# Patient Record
Sex: FEMALE | Race: WHITE | NOT HISPANIC OR LATINO | Employment: OTHER | URBAN - METROPOLITAN AREA
[De-identification: names, ages, dates, MRNs, and addresses within clinical notes are randomized per-mention and may not be internally consistent; named-entity substitution may affect disease eponyms.]

---

## 2017-03-06 ENCOUNTER — OFFICE VISIT - HEALTHEAST (OUTPATIENT)
Dept: FAMILY MEDICINE | Facility: CLINIC | Age: 52
End: 2017-03-06

## 2017-03-06 ENCOUNTER — COMMUNICATION - HEALTHEAST (OUTPATIENT)
Dept: TELEHEALTH | Facility: CLINIC | Age: 52
End: 2017-03-06

## 2017-03-06 DIAGNOSIS — H60.90 OTITIS EXTERNA: ICD-10-CM

## 2017-03-12 ENCOUNTER — OFFICE VISIT - HEALTHEAST (OUTPATIENT)
Dept: FAMILY MEDICINE | Facility: CLINIC | Age: 52
End: 2017-03-12

## 2017-03-12 DIAGNOSIS — H66.90 OTITIS MEDIA: ICD-10-CM

## 2017-03-12 DIAGNOSIS — H65.90 SEROUS OTITIS MEDIA: ICD-10-CM

## 2017-03-12 DIAGNOSIS — H60.90 OTITIS EXTERNA: ICD-10-CM

## 2017-09-05 ENCOUNTER — OFFICE VISIT - HEALTHEAST (OUTPATIENT)
Dept: FAMILY MEDICINE | Facility: CLINIC | Age: 52
End: 2017-09-05

## 2017-09-05 ENCOUNTER — COMMUNICATION - HEALTHEAST (OUTPATIENT)
Dept: SCHEDULING | Facility: CLINIC | Age: 52
End: 2017-09-05

## 2017-09-05 DIAGNOSIS — R42 DIZZINESS: ICD-10-CM

## 2017-09-05 DIAGNOSIS — M79.10 MYALGIA: ICD-10-CM

## 2018-07-06 ENCOUNTER — OFFICE VISIT - HEALTHEAST (OUTPATIENT)
Dept: FAMILY MEDICINE | Facility: CLINIC | Age: 53
End: 2018-07-06

## 2018-07-06 ENCOUNTER — COMMUNICATION - HEALTHEAST (OUTPATIENT)
Dept: TELEHEALTH | Facility: CLINIC | Age: 53
End: 2018-07-06

## 2018-07-06 DIAGNOSIS — J02.9 SORE THROAT: ICD-10-CM

## 2018-07-06 DIAGNOSIS — R07.1 CHEST PAIN ON BREATHING: ICD-10-CM

## 2018-07-06 DIAGNOSIS — Z87.19 HX OF ESOPHAGEAL ULCER: ICD-10-CM

## 2018-07-06 DIAGNOSIS — R07.81 PLEURITIC CHEST PAIN: ICD-10-CM

## 2018-07-06 LAB
ATRIAL RATE - MUSE: 64 BPM
D DIMER PPP FEU-MCNC: 0.53 FEU UG/ML
DIASTOLIC BLOOD PRESSURE - MUSE: NORMAL MMHG
ERYTHROCYTE [DISTWIDTH] IN BLOOD BY AUTOMATED COUNT: 16.8 % (ref 11–14.5)
HCT VFR BLD AUTO: 33.7 % (ref 35–47)
HGB BLD-MCNC: 10.6 G/DL (ref 12–16)
INTERPRETATION ECG - MUSE: NORMAL
MCH RBC QN AUTO: 25.1 PG (ref 27–34)
MCHC RBC AUTO-ENTMCNC: 31.5 G/DL (ref 32–36)
MCV RBC AUTO: 80 FL (ref 80–100)
P AXIS - MUSE: 36 DEGREES
PLATELET # BLD AUTO: 317 THOU/UL (ref 140–440)
PMV BLD AUTO: 9.1 FL (ref 8.5–12.5)
PR INTERVAL - MUSE: 150 MS
QRS DURATION - MUSE: 94 MS
QT - MUSE: 418 MS
QTC - MUSE: 431 MS
R AXIS - MUSE: 18 DEGREES
RBC # BLD AUTO: 4.23 MILL/UL (ref 3.8–5.4)
SYSTOLIC BLOOD PRESSURE - MUSE: NORMAL MMHG
T AXIS - MUSE: 41 DEGREES
TROPONIN I SERPL-MCNC: <0.01 NG/ML (ref 0–0.29)
VENTRICULAR RATE- MUSE: 64 BPM
WBC: 3.8 THOU/UL (ref 4–11)

## 2018-07-06 RX ORDER — METOCLOPRAMIDE 10 MG/1
10 TABLET ORAL
Qty: 30 TABLET | Refills: 0 | Status: SHIPPED | OUTPATIENT
Start: 2018-07-06 | End: 2021-08-04

## 2018-07-11 ENCOUNTER — OFFICE VISIT - HEALTHEAST (OUTPATIENT)
Dept: FAMILY MEDICINE | Facility: CLINIC | Age: 53
End: 2018-07-11

## 2018-07-11 DIAGNOSIS — D50.9 IRON DEFICIENCY ANEMIA, UNSPECIFIED IRON DEFICIENCY ANEMIA TYPE: ICD-10-CM

## 2018-07-11 DIAGNOSIS — E88.09 CYP2D6 DEFICIENCY: ICD-10-CM

## 2018-07-11 DIAGNOSIS — J20.9 ACUTE BRONCHITIS TREATED WITH ANTIBIOTICS IN THE PAST 60 DAYS: ICD-10-CM

## 2018-07-11 DIAGNOSIS — Z12.11 SCREEN FOR COLON CANCER: ICD-10-CM

## 2018-07-11 ASSESSMENT — MIFFLIN-ST. JEOR: SCORE: 1510.34

## 2018-08-01 ENCOUNTER — RECORDS - HEALTHEAST (OUTPATIENT)
Dept: GENERAL RADIOLOGY | Facility: CLINIC | Age: 53
End: 2018-08-01

## 2018-08-01 ENCOUNTER — AMBULATORY - HEALTHEAST (OUTPATIENT)
Dept: LAB | Facility: CLINIC | Age: 53
End: 2018-08-01

## 2018-08-01 DIAGNOSIS — D50.9 IRON DEFICIENCY ANEMIA, UNSPECIFIED IRON DEFICIENCY ANEMIA TYPE: ICD-10-CM

## 2018-08-01 DIAGNOSIS — J20.9 ACUTE BRONCHITIS, UNSPECIFIED: ICD-10-CM

## 2018-08-01 LAB
BASOPHILS # BLD AUTO: 0 THOU/UL (ref 0–0.2)
BASOPHILS NFR BLD AUTO: 1 % (ref 0–2)
EOSINOPHIL # BLD AUTO: 0.1 THOU/UL (ref 0–0.4)
EOSINOPHIL NFR BLD AUTO: 3 % (ref 0–6)
ERYTHROCYTE [DISTWIDTH] IN BLOOD BY AUTOMATED COUNT: 15.3 % (ref 11–14.5)
FERRITIN SERPL-MCNC: 4 NG/ML (ref 10–130)
HCT VFR BLD AUTO: 32.7 % (ref 35–47)
HGB BLD-MCNC: 10.5 G/DL (ref 12–16)
IRON SATN MFR SERPL: 13 % (ref 20–50)
IRON SERPL-MCNC: 51 UG/DL (ref 42–175)
LYMPHOCYTES # BLD AUTO: 1.7 THOU/UL (ref 0.8–4.4)
LYMPHOCYTES NFR BLD AUTO: 41 % (ref 20–40)
MCH RBC QN AUTO: 24.7 PG (ref 27–34)
MCHC RBC AUTO-ENTMCNC: 32.2 G/DL (ref 32–36)
MCV RBC AUTO: 77 FL (ref 80–100)
MONOCYTES # BLD AUTO: 0.3 THOU/UL (ref 0–0.9)
MONOCYTES NFR BLD AUTO: 9 % (ref 2–10)
NEUTROPHILS # BLD AUTO: 1.9 THOU/UL (ref 2–7.7)
NEUTROPHILS NFR BLD AUTO: 47 % (ref 50–70)
PLATELET # BLD AUTO: 411 THOU/UL (ref 140–440)
PMV BLD AUTO: 6.8 FL (ref 7–10)
RBC # BLD AUTO: 4.27 MILL/UL (ref 3.8–5.4)
TIBC SERPL-MCNC: 404 UG/DL (ref 313–563)
TRANSFERRIN SERPL-MCNC: 323 MG/DL (ref 212–360)
WBC: 4 THOU/UL (ref 4–11)

## 2018-08-02 ENCOUNTER — OFFICE VISIT - HEALTHEAST (OUTPATIENT)
Dept: FAMILY MEDICINE | Facility: CLINIC | Age: 53
End: 2018-08-02

## 2018-08-02 DIAGNOSIS — J20.9 ACUTE BRONCHITIS WITH COEXISTING CONDITION REQUIRING PROPHYLACTIC TREATMENT: ICD-10-CM

## 2018-08-02 DIAGNOSIS — D50.9 ANEMIA, IRON DEFICIENCY: ICD-10-CM

## 2018-08-02 LAB
ALBUMIN UR-MCNC: NEGATIVE MG/DL
APPEARANCE UR: CLEAR
BACTERIA #/AREA URNS HPF: ABNORMAL HPF
BILIRUB UR QL STRIP: NEGATIVE
COLOR UR AUTO: YELLOW
GLUCOSE UR STRIP-MCNC: NEGATIVE MG/DL
HGB UR QL STRIP: NEGATIVE
KETONES UR STRIP-MCNC: NEGATIVE MG/DL
LEUKOCYTE ESTERASE UR QL STRIP: ABNORMAL
MUCOUS THREADS #/AREA URNS LPF: ABNORMAL LPF
NITRATE UR QL: NEGATIVE
PH UR STRIP: 6 [PH] (ref 5–8)
RBC #/AREA URNS AUTO: ABNORMAL HPF
SP GR UR STRIP: 1.01 (ref 1–1.03)
SQUAMOUS #/AREA URNS AUTO: ABNORMAL LPF
UROBILINOGEN UR STRIP-ACNC: ABNORMAL
WBC #/AREA URNS AUTO: ABNORMAL HPF
YEAST #/AREA URNS HPF: ABNORMAL HPF

## 2018-08-02 ASSESSMENT — MIFFLIN-ST. JEOR: SCORE: 1483.12

## 2018-08-03 ENCOUNTER — HOSPITAL ENCOUNTER (OUTPATIENT)
Dept: ULTRASOUND IMAGING | Facility: CLINIC | Age: 53
Discharge: HOME | End: 2018-08-03
Attending: FAMILY MEDICINE

## 2018-08-03 ENCOUNTER — COMMUNICATION - HEALTHEAST (OUTPATIENT)
Dept: TELEHEALTH | Facility: CLINIC | Age: 53
End: 2018-08-03

## 2018-08-03 DIAGNOSIS — D50.9 ANEMIA, IRON DEFICIENCY: ICD-10-CM

## 2018-08-06 ENCOUNTER — COMMUNICATION - HEALTHEAST (OUTPATIENT)
Dept: FAMILY MEDICINE | Facility: CLINIC | Age: 53
End: 2018-08-06

## 2019-10-28 ENCOUNTER — COMMUNICATION - HEALTHEAST (OUTPATIENT)
Dept: SCHEDULING | Facility: CLINIC | Age: 54
End: 2019-10-28

## 2021-05-26 ENCOUNTER — RECORDS - HEALTHEAST (OUTPATIENT)
Dept: ADMINISTRATIVE | Facility: CLINIC | Age: 56
End: 2021-05-26

## 2021-05-29 ENCOUNTER — RECORDS - HEALTHEAST (OUTPATIENT)
Dept: ADMINISTRATIVE | Facility: CLINIC | Age: 56
End: 2021-05-29

## 2021-05-30 ENCOUNTER — RECORDS - HEALTHEAST (OUTPATIENT)
Dept: ADMINISTRATIVE | Facility: CLINIC | Age: 56
End: 2021-05-30

## 2021-05-30 VITALS — WEIGHT: 204.2 LBS | BODY MASS INDEX: 32.96 KG/M2

## 2021-05-30 VITALS — BODY MASS INDEX: 33.01 KG/M2 | WEIGHT: 204.5 LBS

## 2021-05-31 ENCOUNTER — RECORDS - HEALTHEAST (OUTPATIENT)
Dept: ADMINISTRATIVE | Facility: CLINIC | Age: 56
End: 2021-05-31

## 2021-05-31 VITALS — BODY MASS INDEX: 31.99 KG/M2 | WEIGHT: 198.2 LBS

## 2021-06-01 VITALS — WEIGHT: 191 LBS | HEIGHT: 66 IN | BODY MASS INDEX: 30.7 KG/M2

## 2021-06-01 VITALS — BODY MASS INDEX: 31.66 KG/M2 | HEIGHT: 66 IN | WEIGHT: 197 LBS

## 2021-06-01 VITALS — BODY MASS INDEX: 31.31 KG/M2 | WEIGHT: 194 LBS

## 2021-06-02 ENCOUNTER — RECORDS - HEALTHEAST (OUTPATIENT)
Dept: ADMINISTRATIVE | Facility: CLINIC | Age: 56
End: 2021-06-02

## 2021-06-02 NOTE — TELEPHONE ENCOUNTER
She need to be seen, preferably at the walk-in care clinic or if symptoms are worsening she can go to the ER.

## 2021-06-02 NOTE — TELEPHONE ENCOUNTER
She is calling about having a UTI.  Has been pushing fluids today.    Has had burning and back ache since 5 am today.  Frequency.    Patient asking if she can come in and leave urine sample and medication called in to her pharmacy.    Pharmacy confirmed.    Provider please advise and have team update patient    Darby Leggett RN, [facility] Nurse Triage/Med Refills RN

## 2021-06-02 NOTE — TELEPHONE ENCOUNTER
Reason for Disposition    Side (flank) or lower back pain present    Severe pain with urination    Protocols used: URINATION PAIN - FEMALE-A-[place]

## 2021-06-09 NOTE — PROGRESS NOTES
Saturday night ear plugged on flight back from vacation snorkeling  Pain right ear and felt pressure throb and poking sharp sensation felt needed to pop.   Then 3 am popped but the hearing did not come.  Popped three times and then hearing felt normal.   Some liquid came out.    ROS: as noted above    OBJECTIVE:   Vitals:    03/06/17 1354   BP: 110/64   Pulse: 80   Resp: 20   Temp: 97.2  F (36.2  C)      Eyes: non icteric, noninflamed  Lungs: no resp distress  Heart: regular  Ankles: no edema  Muscles: nontender  Mental status: euthymic  Neuro: nonfocal  Right canal narrowed some.  Pain on pinna motion.  tms are benign  ASSESSMENT/PLAN:    1. Otitis externa  neomycin-polymyxin-hydrocortisone (CORTISPORIN) otic solution     Anticipate resolution otherwise return.  Return sooner if symptoms worsen.  More than 10 of fifteen total minutes time spent education counseling regarding the issues and care of same as listed in the assessment and plan of this note

## 2021-06-09 NOTE — PROGRESS NOTES
"ASSESSMENT:   1. Otitis externa  ciprofloxacin-dexamethasone (CIPRODEX) otic suspension   2. Otitis media  amoxicillin-clavulanate (AUGMENTIN) 875-125 mg per tablet   3. Serous otitis media             PLAN:  Right ear- internal ear infection (otitis media) PLUS external ear infection (otitis externa)  Left ear- serous otitis (non infected fluid behind ear drum)    1. STOP Polytrim drops. START ciprodex drops x 7 days.    2. Add in oral Augmentin twice daily with food x 10 days to treat right otitis media (internal ear infection). Take with food. Take a probiotic while on the medication.    3. Add in Mucinex (plain variety- not DM) twice daily x 7 days. This treats the serous otitis (non-infected fluid behind left ear drum).      Follow up with primary care provider if not improving or worsening. Come back immediately if fevers, extreme pain, fluid draining from ear, hearing loss, facial swelling, or any other new concerns.      Discussed red flags for immediate return to clinic/ER, as well as indications for follow up if no improvement. Patient understood and agreed to plan. Patient was stable for discharge.          SUBJECTIVE:   Darlene Rhodes is a 51 y.o. female who presents today for evaluation of right ear pain persistent for 1 week. She was prescribed Polytrim otic drops on Monday, 3/6 for otitis externa. She felt like symptoms were getting somewhat better but are now again worsening and she is on day 6 of treatment. Pain is radiating down to the throat and right side of jaw. Last night, when symptoms were getting worse, she had a moment of time where she felt \"like she was going to pass out.\" She felt nauseous when this occurred. She denies actual syncope. None of this has persisted. No drainage from ear. Hearing sounds muffled on right side but no total hearing loss.  She is able to open her mouth. No fevers. No history of chronic otitis externa. She is not diabetic.    Past Medical History:  Patient " Active Problem List   Diagnosis     Esophageal Reflux     Allergies     Feet Hallux Limitus     Vitamin D Deficiency     Hyperlipidemia     Drug Toxicity     Plantar Fasciitis Of The Left Foot     CYP2D6 deficiency       Surgical History:  Reviewed; Non-contributory      Family History:  Family History   Problem Relation Age of Onset     Anuerysm Paternal Grandmother      brain     Prostate cancer Paternal Grandfather      Reviewed; Non-contributory      Social History:    History   Smoking Status     Never Smoker   Smokeless Tobacco     Not on file     Smoking: none  Alcohol use: occasionally  Other drug use: none  Occupation: director of [employer]        Current Medications:  Current Outpatient Prescriptions on File Prior to Visit   Medication Sig Dispense Refill     lansoprazole (PREVACID) 30 MG capsule TAKE 1 CAPSULE (30 MG TOTAL) BY MOUTH DAILY. 90 capsule 2     neomycin-polymyxin-hydrocortisone (CORTISPORIN) otic solution Administer 3 drops to the right ear 3 (three) times a day for 10 days. 10 mL 2     phenazopyridine (PYRIDIUM) 200 MG tablet Take 1 tablet (200 mg total) by mouth 3 (three) times a day as needed for pain. 15 tablet 1     viscous lidocaine HC (LIDOCAINE) 2 % Soln viscous solution USE 30 ML'S BY MOUTH EVERY 2-4 HOURS AS NEEDED 100 mL PRN     No current facility-administered medications on file prior to visit.        Allergies:   Allergies   Allergen Reactions     Animal Dander      Grass      Hydrocodone-Acetaminophen      Annotation: patient does not respond to this medication       Hydromorphone      Annotation: patient gets dizzy, headache, and does not respond to this       Hydroxyzine Pamoate      Annotation: does not work       Iodinated Contrast Media - Oral And Iv Dye Itching     Other       Morphine      Annotation: patient does not respond to this medication       Ondansetron      Annotation: does not work       Oxycodone-Acetaminophen      Annotation: patient does not  respond to this medication         I personally reviewed patient's past medical, surgical, social, family history and allergies.    ROS:  Review of Systems  See HPI, otherwise negative      OBJECTIVE:   Visit Vitals     /72     Pulse 74     Temp 98.3  F (36.8  C) (Oral)     Resp 10     Wt 204 lb 3.2 oz (92.6 kg)     LMP 02/07/2017     SpO2 100%     BMI 32.96 kg/m2         General Appearance:  Alert, well-appearing female in NAD. Afebrile.    HEENT:  Head: Atraumatic, normocephalic.  No facial swelling. No jaw or temporal tenderness.  Eyes: Conjunctiva clear, Lids normal.  Ears:  Right ear: canal edema and mild erythema but still patent. Scant purulent drainage in lower canal. TM is mildly erythematous and not transparent. Left ear: TM with thick, white fluid behind it, not transparent. No canal erythema or edema.  Nose: nares patent.  Oropharynx: No trismus. No posterior pharyngeal erythema or exudate. No tonsillar hypertrophy. Uvula midline. Moist mucus membranes.  Neck: Supple, no lymphadenopathy. Mild tenderness with palpation or infra-auricular area on right side of neck. No meningismus.  Respiratory: No distress. Lungs clear to ausculation bilaterally.   Cardiovascular: Regular rate and rhythm, no murmur, rub or gallop. No obvious chest wall deformities.   Neurologic: Alert and orientated appropriately. No focal deficits. Facial movements symmetric.

## 2021-06-12 NOTE — PROGRESS NOTES
Assessment:     1. Myalgia  HM2(CBC w/o Differential)    Lyme Antibody [place]   2. Dizziness  HM2(CBC w/o Differential)    Lyme Antibody [place]     Results for orders placed or performed in visit on 09/05/17   HM2(CBC w/o Differential)   Result Value Ref Range    WBC 6.0 4.0 - 11.0 thou/uL    RBC 4.43 3.80 - 5.40 mill/uL    Hemoglobin 11.4 (L) 12.0 - 16.0 g/dL    Hematocrit 35.0 35.0 - 47.0 %    MCV 79 (L) 80 - 100 fL    MCH 25.7 (L) 27.0 - 34.0 pg    MCHC 32.6 32.0 - 36.0 g/dL    RDW 13.5 11.0 - 14.5 %    Platelets 343 140 - 440 thou/uL    MPV 6.8 (L) 7.0 - 10.0 fL     Myalgia, fatigue and dizziness - all resolving. May be a viral infection. Will also check lyme, although low pretest probability. Normal exam, no red flags at this time.      Plan:   Myalgia and dizziness  Will contact with lab results and treat if indicated  Push fluids, change positions slowly  Get extra rest today  Tylenol or Ibuprofen as needed for body aches  Monitor for fever or symptoms. Return to clinic if symptoms are not improving as expected or if worsening in any way.       Subjective:       Darlene Rhodes is a 51 y.o. female who presents for evaluation of dizziness and joint and muscle pain and fatigue.    This started 3 days ago with significant joint and muscle pains which has since improved. She rates the pain currently as a 3/10 generalized achiness. She has taken Ibuprofen 600mg  without obvious results. She has also had fatigue. Today she woke up early this morning and was very dizzy, the room spinning around her. She had trouble walking because of unsteady gait, slight nausea. It has gotten significantly better since then but does still not feel quite right. She has been eating and drinking well. No medication changes. She is occasionally outdoors, but rare exposure to woodsy areas. She does have pets.     Denies fever, chills, URI symptoms, cough, chest pain, SOB, vision or speech changes, weakness, paresthesia, rash.      Review of Systems  Pertinent items are noted in HPI.      Objective:      /76  Pulse 80  Temp 98.9  F (37.2  C) (Oral)   Resp 16  Wt 198 lb 3.2 oz (89.9 kg)  SpO2 99%  Breastfeeding? No  BMI 31.99 kg/m2  General appearance: alert, appears stated age and cooperative  Head: Normocephalic, without obvious abnormality, atraumatic  Eyes: conjunctivae/corneas clear. PERRL, EOM's intact. Fundi benign.  Ears: normal TM's and external ear canals both ears  Nose: Nares normal. Septum midline. Mucosa normal. No drainage or sinus tenderness.  Throat: lips, mucosa, and tongue normal; teeth and gums normal  Neck: no adenopathy and supple, symmetrical, trachea midline  Lungs: clear to auscultation bilaterally  Heart: regular rate and rhythm, S1, S2 normal, no murmur, click, rub or gallop  Skin: Skin color, texture, turgor normal. No rashes or lesions  Neurologic: Alert and oriented X 3, normal strength and tone. Normal symmetric reflexes. Normal coordination and gait, CN II -XII grossly intact. Neg romberg and pronator drift. Chloe Hallpike does not lateralize to either side.

## 2021-06-19 NOTE — PROGRESS NOTES
Subjective:      Patient ID: Darlene Rhodes is a 52 y.o. female.    Chief Complaint:    HPI Darlene Rhodes is a 52 y.o. female who presents today cough and crackling sensation in her lungs x 3 days. Patient feel slighly shortness of breath when walking walking stairs. She was feeling faint with this type of exertion. She denies any hx of heart disease and palpitations. Several years ago she was getting sharp chest pain that was from an esophageal ulcer and a hiatal hernia.  She was chronically started on Prevacid and she occasionally has endoscopy checkups.  She will at times get esophageal spasms that go on for hours. She takes a GI cocktail as needed for her esophageal spasms. In the past couple of months she has had these spasms twice.  When she has esophageal spasms she finds it difficult to take a deep breaths because of the pain.  About a month ago she had a cold with sore throat, chills, and cough.  It seemed to resolve but then when this returned she thought maybe it was an unresolved infection.        Social History   Substance Use Topics     Smoking status: Never Smoker     Smokeless tobacco: Never Used     Alcohol use Yes      Comment: 1-2 / week       Review of Systems   Constitutional: Negative for fever.   HENT: Negative for congestion, ear pain, rhinorrhea and sore throat.    Respiratory: Positive for cough, chest tightness and shortness of breath.    Gastrointestinal: Positive for nausea. Negative for diarrhea and vomiting.       Objective:     /68  Pulse 88  Temp 98.6  F (37  C) (Oral)   Resp 14  Wt 194 lb (88 kg)  SpO2 98%  Breastfeeding? No  BMI 31.31 kg/m2    Physical Exam   Constitutional: She appears well-developed and well-nourished. No distress.   HENT:   Head: Normocephalic and atraumatic.   Right Ear: External ear normal.   Left Ear: External ear normal.   Eyes: Conjunctivae are normal.   Neck: Normal range of motion. Neck supple.   Cardiovascular: Normal rate, regular  rhythm and normal heart sounds.  Exam reveals no gallop and no friction rub.    No murmur heard.  Pulmonary/Chest: Effort normal and breath sounds normal. No respiratory distress. She has no wheezes. She has no rales. She exhibits no tenderness.   Loose cough intermittently   Lymphadenopathy:     She has no cervical adenopathy.   Skin: She is not diaphoretic.   Psychiatric: She has a normal mood and affect. Her behavior is normal. Judgment and thought content normal.   Nursing note and vitals reviewed.    Labs:  Recent Results (from the past 24 hour(s))   Electrocardiogram Perform - Clinic   Result Value Ref Range    SYSTOLIC BLOOD PRESSURE  mmHg    DIASTOLIC BLOOD PRESSURE  mmHg    VENTRICULAR RATE 64 BPM    ATRIAL RATE 64 BPM    P-R INTERVAL 150 ms    QRS DURATION 94 ms    Q-T INTERVAL 418 ms    QTC CALCULATION (BEZET) 431 ms    P Axis 36 degrees    R AXIS 18 degrees    T AXIS 41 degrees    MUSE DIAGNOSIS       Normal sinus rhythm  Normal ECG  When compared with ECG of 01-MAR-2016 21:19,  No significant change was found     D-dimer, Quantitative   Result Value Ref Range    D-Dimer, Quant 0.53 (H) <=0.50 FEU ug/mL   HM2(CBC w/o Differential)   Result Value Ref Range    WBC 3.8 (L) 4.0 - 11.0 thou/uL    RBC 4.23 3.80 - 5.40 mill/uL    Hemoglobin 10.6 (L) 12.0 - 16.0 g/dL    Hematocrit 33.7 (L) 35.0 - 47.0 %    MCV 80 80 - 100 fL    MCH 25.1 (L) 27.0 - 34.0 pg    MCHC 31.5 (L) 32.0 - 36.0 g/dL    RDW 16.8 (H) 11.0 - 14.5 %    Platelets 317 140 - 440 thou/uL    MPV 9.1 8.5 - 12.5 fL         Clinical Decision Making:  Patient's ASCVD is 1.9% for risk of cardiac event with her lipid panel collected in 2013.  I suspect that this chest pain is pleuritic is likely secondary to esophageal ulcer pain and spasms.  Patient's chest x-ray showed mild atelectasis which may contribute to her cough.  Suspect that this is secondary to shallow breathing from guarding the esophageal pain.  Patient's chest x-ray was negative for any  cardiomegaly, pleural effusion,  or pulmonary edema.  Her EKG was negative for any signs of ischemic changes or pulmonary embolism.  She is vitally normal.  Single troponin, d-dimer, and CBC were drawn today to help rule out cardiac involvement, embolism, and infectious process. Patient is non-diaphoretic and in NAD, so I have low suspicion of cardiac involvement. Patient has follow up with her PCP in 5 days. Patient was started on Reglan and Lidocaine was reordered for symptomatic relief. Recommend deep breathing exercises for atelectasis tx. D-dimer came back elevated. The patient was called and informed and instructed to seek emergency medical attention for further workup. Patient was agreeable to r/o PE.     Assessment:     Procedures    1. Pleuritic chest pain  XR Chest 2 Views   2. Chest pain on breathing  Electrocardiogram Perform - Clinic    Troponin I    D-dimer, Quantitative    HM2(CBC w/o Differential)    benzonatate (TESSALON) 200 MG capsule   3. Hx of esophageal ulcer  metoclopramide (REGLAN) 10 MG tablet   4. Sore throat  viscous lidocaine HC (LIDOCAINE) 2 % Soln viscous solution         Patient Instructions   1.  Your chest x-ray showed there was some atelectasis or collapsing of small alveoli in your lungs.  To help combat this is important to take full deep breaths intermittently every 15-25 minutes.  2.  Low suspicion of cardiac cause of your cough/chest pain.  Your EKG was normal.  He will be notified if your troponin is elevated.  If that does occur you will be instructed to seek emergency medical attention.  3.  If your d-dimer test is elevated you will also be instructed to follow-up at the emergency department for further evaluation.  I suspect that it will be negative as you do not have risk factors for a blood clot in the lungs.  4.  I suspect that this was mostly due to your history of esophageal ulcer and hiatal hernia.  I have refilled your prescription for lidocaine to mix with Maalox.   I have also prescribed Reglan which helps move food through her intestines faster to the prevent GERD symptoms.  I recommend taking this as prescribed until you follow-up with your primary care provider for further instruction.  5.  Seek emergency  medical attention if you have more severe crushing chest pain, shortness of breath, fainting, or coughing up blood.

## 2021-06-19 NOTE — PROGRESS NOTES
"OFFICE VISIT - FAMILY MEDICINE     ASSESSMENT AND PLAN     1. Anemia, iron deficiency  US Pelvis With Transvaginal Non OB    US Abdomen Complete    Urinalysis   2. Acute bronchitis with coexisting condition requiring prophylactic treatment     Iron deficiency anemia, differential diagnosis discussed, suspecting from hiatal hernia.  Rule out gynecologic: Genitourinary bleeding by getting a pelvic and abdominal with kidney ultrasound, urine analysis if normal then venous emphasize on management of hiatal hernia with PPI and possibly get a surgery consult.  The meantime should be taking iron sulfate available over-the-counter vitamin C and follow-up in about 2-3 months sooner if she get worse.    CHIEF COMPLAINT   Bronchitis (07/11); Results (X-RAY); and Pneumonia (F/U)    HPI   Darlene Rhodes is a 52 y.o. female.  No Patient Care Coordination Note on file.  Following up on a recent lung infection and anemia, she was treated with Augmentin and symptoms improved; she still coughing but definitely better compared to previously.  Chest x-ray done yesterday ahead of her appointment did not show any persistent infiltrate.  Has been feeling fatigue, hemoglobin has been stable at 10.5.  Significantly decreased iron.  Menstrual period has been irregular with no excessive bleeding, no blood in her urine.  Has also been doing a lot of walking because she is applying for city [place] office, election day will be mid August.   Previous endoscopy done on March 30, 2009 and esophagram done on February 26, 2007 reviewed, had mention of hiatal hernia at that time; abdominal CT scan done in June 4, 2012 only showed a small cyst in the upper pole of the right kidney but normal appearing kidneys.  Negative screening colonoscopy At [place] a few years ago.      Review of Systems As per HPI, otherwise negative.    OBJECTIVE   /64 (Patient Site: Right Arm)  Pulse 68  Temp 98.3  F (36.8  C) (Oral)   Resp 13  Ht 5' 6\" (1.676 m)  Wt " 191 lb (86.6 kg)  LMP 07/04/2018 (Approximate)  SpO2 99%  BMI 30.83 kg/m2  Physical Exam   Constitutional: She is oriented to person, place, and time. She appears well-developed and well-nourished.   HENT:   Head: Normocephalic and atraumatic.   Neck: Normal range of motion. Neck supple. No JVD present. No tracheal deviation present. No thyromegaly present.   Cardiovascular: Normal rate, regular rhythm, normal heart sounds and intact distal pulses.  Exam reveals no gallop and no friction rub.    No murmur heard.  Pulmonary/Chest: Effort normal and breath sounds normal. No respiratory distress. She has no wheezes. She has no rales.   Abdominal: She exhibits no distension. There is no tenderness. There is no rebound.   Musculoskeletal: She exhibits no edema or tenderness.   Lymphadenopathy:     She has no cervical adenopathy.   Neurological: She is alert and oriented to person, place, and time. Coordination normal.   Psychiatric: She has a normal mood and affect. Judgment and thought content normal.       [facility]     Family History   Problem Relation Age of Onset     Anuerysm Paternal Grandmother      brain     Prostate cancer Paternal Grandfather      Social History     Social History     Marital status:      Spouse name: N/A     Number of children: N/A     Years of education: N/A     Occupational History     Not on file.     Social History Main Topics     Smoking status: Never Smoker     Smokeless tobacco: Never Used     Alcohol use Yes      Comment: 1-2 / week     Drug use: Not on file     Sexual activity: Yes     Partners: Male     Other Topics Concern     Not on file     Social History Narrative     Relevant history was reviewed with the patient today, unless noted in HPI, nothing is pertinent for this visit.  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     Plantar Fasciitis Of The Left Foot      Overview Note:     Created by Conversion          Esophageal Reflux      Overview Note:     Created by Conversion         Allergies      Overview Note:     Created by Conversion         Feet Hallux Limitus      Overview Note:     Created by Conversion         Vitamin D Deficiency      Overview Note:     Created by Conversion    Replacement Utility updated for latest IMO load       Hyperlipidemia      Overview Note:     Created by Conversion         Drug Toxicity      Overview Note:     Created by Conversion         Past Surgical History:   Procedure Laterality Date     MD RX ECTOP PREG BY SCOPE,RMV TUBE/OVRY      Description: Laparoscopic Excision Of Ectopic Pregnancy And Salpingectomy;  Recorded: 08/31/2010;       RESULTS/CONSULTS (Lab/Rad)     Recent Results (from the past 168 hour(s))   Ferritin   Result Value Ref Range    Ferritin 4 (L) 10 - 130 ng/mL   Iron and Transferrin Iron Binding Capacity   Result Value Ref Range    Iron 51 42 - 175 ug/dL    Transferrin 323 212 - 360 mg/dL    Transferrin Saturation, Calculated 13 (L) 20 - 50 %    Transferrin IBC, Calculated 404 313 - 563 ug/dL   HM1 (CBC with Diff)   Result Value Ref Range    WBC 4.0 4.0 - 11.0 thou/uL    RBC 4.27 3.80 - 5.40 mill/uL    Hemoglobin 10.5 (L) 12.0 - 16.0 g/dL    Hematocrit 32.7 (L) 35.0 - 47.0 %    MCV 77 (L) 80 - 100 fL    MCH 24.7 (L) 27.0 - 34.0 pg    MCHC 32.2 32.0 - 36.0 g/dL    RDW 15.3 (H) 11.0 - 14.5 %    Platelets 411 140 - 440 thou/uL    MPV 6.8 (L) 7.0 - 10.0 fL    Neutrophils % 47 (L) 50 - 70 %    Lymphocytes % 41 (H) 20 - 40 %    Monocytes % 9 2 - 10 %    Eosinophils % 3 0 - 6 %    Basophils % 1 0 - 2 %    Neutrophils Absolute 1.9 (L) 2.0 - 7.7 thou/uL    Lymphocytes Absolute 1.7 0.8 - 4.4 thou/uL    Monocytes Absolute 0.3 0.0 - 0.9 thou/uL    Eosinophils Absolute 0.1 0.0 - 0.4 thou/uL    Basophils Absolute 0.0 0.0 - 0.2 thou/uL   Urinalysis   Result Value Ref Range    Color, UA Yellow Colorless, Yellow, Straw, Light Yellow    Clarity, UA Clear Clear    Glucose, UA Negative  Negative    Bilirubin, UA Negative Negative    Ketones, UA Negative Negative    Specific Gravity, UA 1.015 1.005 - 1.030    Blood, UA Negative Negative    pH, UA 6.0 5.0 - 8.0    Protein, UA Negative Negative mg/dL    Urobilinogen, UA 0.2 E.U./dL 0.2 E.U./dL, 1.0 E.U./dL    Nitrite, UA Negative Negative    Leukocytes, UA Trace (!) Negative    Bacteria, UA Few (!) None Seen hpf    RBC, UA None Seen None Seen, 0-2 hpf    WBC, UA 0-5 None Seen, 0-5 hpf    Squam Epithel, UA 5-10 (!) None Seen, 0-5 lpf    Yeast, UA Few (!) None Seen hpf    Mucus, UA Few (!) None Seen lpf     Xr Chest 2 Views    Result Date: 8/1/2018  XR CHEST 2 VIEWS 8/1/2018 11:20 AM INDICATION: Atelectasis on cxr 7/6/2018. Cough. COMPARISON: 7/6/2018 FINDINGS: . Normal heart size. Lung fields are clear. No infiltrate or effusion.    Xr Chest 2 Views    Result Date: 7/6/2018  EXAM DATE:         07/06/2018 [facility] X-RAY CHEST, 2 VIEWS, FRONTAL AND LATERAL 7/6/2018 11:30 AM INDICATION: cough and chest congestion COMPARISON: None. FINDINGS: Minimal linear opacities are noted in the left lateral costophrenic angle. Findings suggest some fibroatelectasis. Right lung is clear. Heart and pulmonary vascularity are normal. No effusions.     Nm Lung Vq Scan    Result Date: 7/6/2018  NM LUNG VQ SCAN 7/6/2018 4:48 PM INDICATION: Coughing, short of breath, elevated d-dimer, ct contrast allergy same as above. TECHNIQUE: 45.8 mCi technetium 99m DTPA aerosol. 6.5 mCi technetium 99m MAA IV. COMPARISON: Chest radiograph from 7/6/2018. FINDINGS: Some radiotracer swallowed. Few small matched ventilation and perfusion defects. No significant mismatched defects.     CONCLUSION: 1.  Low probability for pulmonary artery most.       MEDICATIONS     Current Outpatient Prescriptions on File Prior to Visit   Medication Sig Dispense Refill     lansoprazole (PREVACID) 30 MG capsule TAKE 1 CAPSULE (30 MG TOTAL) BY MOUTH DAILY. 90 capsule 2     metoclopramide  (REGLAN) 10 MG tablet Take 1 tablet (10 mg total) by mouth 3 (three) times a day before meals. 30 tablet 0     viscous lidocaine HC (LIDOCAINE) 2 % Soln viscous solution USE 30 ML'S BY MOUTH EVERY 2-4 HOURS AS NEEDED 100 mL PRN     [DISCONTINUED] benzonatate (TESSALON) 200 MG capsule Take 1 capsule (200 mg total) by mouth 3 (three) times a day as needed. 21 capsule 0     No current facility-administered medications on file prior to visit.        HEALTH MAINTENANCE / SCREENING   PHQ-2 Total Score: 0 (7/6/2018 10:07 AM), No Data Recorded,No Data Recorded  Immunization History   Administered Date(s) Administered     DT (pediatric) 08/04/1997     Hep A, Adult IM (19yr & older) 02/17/2009, 08/31/2010     Hep A, historic 02/17/2009, 08/31/2010     Hep B, Adult 08/31/2010, 02/08/2011, 09/13/2011     Hep B, historic 08/31/2010, 02/08/2011, 09/13/2011     Influenza, inj, historic,unspecified 11/27/2007     Influenza,seasonal quad, PF, 36+MOS 10/25/2016     Influenza,seasonal, Inj IIV3 11/02/2005, 11/27/2007     Td,adult,historic,unspecified 10/01/2007     Tdap 10/01/2007     Health Maintenance   Topic     ADVANCE DIRECTIVES DISCUSSED WITH PATIENT      TD 18+ HE      TDAP ADULT ONE TIME DOSE      PAP SMEAR      INFLUENZA VACCINE RULE BASED (1)     COLONOSCOPY      MAMMOGRAM        Leni Cordero MD  Family Medicine, [facility]     This note was dictated using a voice recognition software.  Any grammatical or context distortion are unintentional and inherent to the software.

## 2021-06-19 NOTE — PROGRESS NOTES
OFFICE VISIT - FAMILY MEDICINE     ASSESSMENT AND PLAN     1. Acute bronchitis treated with antibiotics in the past 60 days  amoxicillin-clavulanate (AUGMENTIN) 875-125 mg per tablet    XR Chest 2 Views   2. Iron deficiency anemia, unspecified iron deficiency anemia type  HM1(CBC and Differential)    Ferritin    Iron and Transferrin Iron Binding Capacity   3. Screen for colon cancer  Ambulatory referral for Colonoscopy   4. CYP2D6 deficiency (H)     Acute bronchitis will be treated with Augmentin, take as directed, possible side effect discussed.  Mild iron deficiency anemia, we will recheck hemoglobin and iron level in about 3-4 weeks, consider starting iron replacement treatment if is still low.  Healthcare maintenance, screening for colon cancer discussed, she will be scheduling a colonoscopy down the road.  CYP2D6 deficiency, unresponsive to some anesthetics, narcotics also has contrast allergy, we will continue to avoid exposure.    CHIEF COMPLAINT   Cough (X 1 WK)    HPI   Darlene Rhodes is a 52 y.o. female.  No Patient Care Coordination Note on file.    Was initially seen at the walk-in care clinic with cough, d-dimer was elevated, chest x-ray did show atelectasis, she was sent to the ER with a VQ scan that showed low probability of PE.  She has CYP 2 D6 deficiency, and cannot tolerate the contrast.  She was placed on Z-Freeman she completed her therapy a few days ago, but she still coughing producing yellowish mucus.  Denies any worsening chest pain or shortness of breath, no new fever chills.  Lab result in ER notes were reviewed, she had mild iron deficiency anemia that she is was not aware of.  Denies any recent bleeding beside her perimenopausal symptoms.  Mild fatigue only but thought to be from her ongoing infection.  Chest x-ray and VQ scan during the ER were reviewed.      Review of Systems As per HPI, otherwise negative.    OBJECTIVE   /62 (Patient Site: Left Arm)  Pulse 64  Temp 98.2  F (36.8  " C) (Oral)   Resp 13  Ht 5' 6\" (1.676 m)  Wt 197 lb (89.4 kg)  LMP 07/04/2018 (Approximate)  SpO2 97%  BMI 31.8 kg/m2  Physical Exam   Constitutional: She is oriented to person, place, and time. She appears well-developed and well-nourished.   HENT:   Head: Normocephalic and atraumatic.   Neck: Normal range of motion. Neck supple. No JVD present. No tracheal deviation present. No thyromegaly present.   Cardiovascular: Normal rate, regular rhythm, normal heart sounds and intact distal pulses.  Exam reveals no gallop and no friction rub.    No murmur heard.  Pulmonary/Chest: Effort normal. No respiratory distress. She has no wheezes. She has rales (Upper lung fields area bilateral).   Musculoskeletal: She exhibits no edema or tenderness.   Lymphadenopathy:     She has no cervical adenopathy.   Neurological: She is alert and oriented to person, place, and time. Coordination normal.   Psychiatric: She has a normal mood and affect. Judgment and thought content normal.       [facility]     Family History   Problem Relation Age of Onset     Anuerysm Paternal Grandmother      brain     Prostate cancer Paternal Grandfather      Social History     Social History     Marital status:      Spouse name: N/A     Number of children: N/A     Years of education: N/A     Occupational History     Not on file.     Social History Main Topics     Smoking status: Never Smoker     Smokeless tobacco: Never Used     Alcohol use Yes      Comment: 1-2 / week     Drug use: Not on file     Sexual activity: Yes     Partners: Male     Other Topics Concern     Not on file     Social History Narrative     Relevant history was reviewed with the patient today, unless noted in HPI, nothing is pertinent for this visit.  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     Plantar Fasciitis Of The Left Foot      Overview Note:     Created by Conversion         Esophageal Reflux      Overview Note:    "  Created by Conversion         Allergies      Overview Note:     Created by Conversion         Feet Hallux Limitus      Overview Note:     Created by Conversion         Vitamin D Deficiency      Overview Note:     Created by Conversion    Replacement Utility updated for latest IMO load       Hyperlipidemia      Overview Note:     Created by Conversion         Drug Toxicity      Overview Note:     Created by Conversion         Past Surgical History:   Procedure Laterality Date     [place] RX ECTOP PREG BY SCOPE,RMV TUBE/OVRY      Description: Laparoscopic Excision Of Ectopic Pregnancy And Salpingectomy;  Recorded: 08/31/2010;       RESULTS/CONSULTS (Lab/Rad)     Recent Results (from the past 168 hour(s))   Electrocardiogram Perform - Clinic   Result Value Ref Range    SYSTOLIC BLOOD PRESSURE  mmHg    DIASTOLIC BLOOD PRESSURE  mmHg    VENTRICULAR RATE 64 BPM    ATRIAL RATE 64 BPM    P-R INTERVAL 150 ms    QRS DURATION 94 ms    Q-T INTERVAL 418 ms    QTC CALCULATION (BEZET) 431 ms    P Axis 36 degrees    R AXIS 18 degrees    T AXIS 41 degrees    MUSE DIAGNOSIS       Normal sinus rhythm  Normal ECG  When compared with ECG of 01-MAR-2016 21:19,  No significant change was found  Confirmed by PAYAL TRIPATHI MD LOC: ([id number]) on 7/6/2018 2:46:39 PM     Troponin I   Result Value Ref Range    Troponin I <0.01 0.00 - 0.29 ng/mL   D-dimer, Quantitative   Result Value Ref Range    D-Dimer, Quant 0.53 (H) <=0.50 FEU ug/mL   HM2(CBC w/o Differential)   Result Value Ref Range    WBC 3.8 (L) 4.0 - 11.0 thou/uL    RBC 4.23 3.80 - 5.40 mill/uL    Hemoglobin 10.6 (L) 12.0 - 16.0 g/dL    Hematocrit 33.7 (L) 35.0 - 47.0 %    MCV 80 80 - 100 fL    MCH 25.1 (L) 27.0 - 34.0 pg    MCHC 31.5 (L) 32.0 - 36.0 g/dL    RDW 16.8 (H) 11.0 - 14.5 %    Platelets 317 140 - 440 thou/uL    MPV 9.1 8.5 - 12.5 fL   Troponin I   Result Value Ref Range    Troponin I <0.01 0.00 - 0.29 ng/mL   HCG, Qual   Result Value Ref Range    Beta hCG Qualitative Negative  Negative     Xr Chest 2 Views    Result Date: 7/6/2018  EXAM DATE:         07/06/2018 [facility] X-RAY CHEST, 2 VIEWS, FRONTAL AND LATERAL 7/6/2018 11:30 AM INDICATION: cough and chest congestion COMPARISON: None. FINDINGS: Minimal linear opacities are noted in the left lateral costophrenic angle. Findings suggest some fibroatelectasis. Right lung is clear. Heart and pulmonary vascularity are normal. No effusions.     Nm Lung Vq Scan    Result Date: 7/6/2018  NM LUNG VQ SCAN 7/6/2018 4:48 PM INDICATION: Coughing, short of breath, elevated d-dimer, ct contrast allergy same as above. TECHNIQUE: 45.8 mCi technetium 99m DTPA aerosol. 6.5 mCi technetium 99m MAA IV. COMPARISON: Chest radiograph from 7/6/2018. FINDINGS: Some radiotracer swallowed. Few small matched ventilation and perfusion defects. No significant mismatched defects.     CONCLUSION: 1.  Low probability for pulmonary artery most.       MEDICATIONS     Current Outpatient Prescriptions on File Prior to Visit   Medication Sig Dispense Refill     benzonatate (TESSALON) 200 MG capsule Take 1 capsule (200 mg total) by mouth 3 (three) times a day as needed. 21 capsule 0     lansoprazole (PREVACID) 30 MG capsule TAKE 1 CAPSULE (30 MG TOTAL) BY MOUTH DAILY. 90 capsule 2     metoclopramide (REGLAN) 10 MG tablet Take 1 tablet (10 mg total) by mouth 3 (three) times a day before meals. 30 tablet 0     viscous lidocaine HC (LIDOCAINE) 2 % Soln viscous solution USE 30 ML'S BY MOUTH EVERY 2-4 HOURS AS NEEDED 100 mL PRN     No current facility-administered medications on file prior to visit.        HEALTH MAINTENANCE / SCREENING   PHQ-2 Total Score: 0 (7/6/2018 10:07 AM), No Data Recorded,No Data Recorded  Immunization History   Administered Date(s) Administered     DT (pediatric) 08/04/1997     Hep A, Adult IM (19yr & older) 02/17/2009, 08/31/2010     Hep A, historic 02/17/2009, 08/31/2010     Hep B, Adult 08/31/2010, 02/08/2011, 09/13/2011     Hep B,  historic 08/31/2010, 02/08/2011, 09/13/2011     Influenza, inj, historic,unspecified 11/27/2007     Influenza,seasonal quad, PF, 36+MOS 10/25/2016     Influenza,seasonal, Inj IIV3 11/02/2005, 11/27/2007     Td,adult,historic,unspecified 10/01/2007     Tdap 10/01/2007     Health Maintenance   Topic     ADVANCE DIRECTIVES DISCUSSED WITH PATIENT      TD 18+ HE      TDAP ADULT ONE TIME DOSE      PAP SMEAR      COLONOSCOPY      MAMMOGRAM      INFLUENZA VACCINE RULE BASED (1)       Leni Cordero MD  Family Medicine, [facility]     This note was dictated using a voice recognition software.  Any grammatical or context distortion are unintentional and inherent to the software.

## 2021-07-13 ENCOUNTER — RECORDS - HEALTHEAST (OUTPATIENT)
Dept: ADMINISTRATIVE | Facility: CLINIC | Age: 56
End: 2021-07-13

## 2021-07-21 ENCOUNTER — RECORDS - HEALTHEAST (OUTPATIENT)
Dept: ADMINISTRATIVE | Facility: CLINIC | Age: 56
End: 2021-07-21

## 2021-08-04 ENCOUNTER — OFFICE VISIT (OUTPATIENT)
Dept: INTERNAL MEDICINE | Facility: CLINIC | Age: 56
End: 2021-08-04
Payer: COMMERCIAL

## 2021-08-04 VITALS
WEIGHT: 209 LBS | OXYGEN SATURATION: 98 % | SYSTOLIC BLOOD PRESSURE: 120 MMHG | BODY MASS INDEX: 32.8 KG/M2 | HEIGHT: 67 IN | HEART RATE: 83 BPM | DIASTOLIC BLOOD PRESSURE: 88 MMHG

## 2021-08-04 DIAGNOSIS — E55.9 VITAMIN D DEFICIENCY: ICD-10-CM

## 2021-08-04 DIAGNOSIS — K44.9 HH (HIATUS HERNIA): ICD-10-CM

## 2021-08-04 DIAGNOSIS — Z78.0 MENOPAUSE: ICD-10-CM

## 2021-08-04 DIAGNOSIS — D64.89 ANEMIA DUE TO OTHER CAUSE, NOT CLASSIFIED: ICD-10-CM

## 2021-08-04 DIAGNOSIS — K31.7 MULTIPLE GASTRIC POLYPS: ICD-10-CM

## 2021-08-04 DIAGNOSIS — K21.9 GASTROESOPHAGEAL REFLUX DISEASE WITHOUT ESOPHAGITIS: Primary | ICD-10-CM

## 2021-08-04 DIAGNOSIS — K22.10 ULCER OF ESOPHAGUS WITHOUT BLEEDING: ICD-10-CM

## 2021-08-04 DIAGNOSIS — E88.89 CYP2D6 POOR METABOLIZER (H): ICD-10-CM

## 2021-08-04 DIAGNOSIS — Z12.31 ENCOUNTER FOR SCREENING MAMMOGRAM FOR BREAST CANCER: ICD-10-CM

## 2021-08-04 LAB
ALBUMIN SERPL-MCNC: 4 G/DL (ref 3.5–5)
ALP SERPL-CCNC: 67 U/L (ref 45–120)
ALT SERPL W P-5'-P-CCNC: 50 U/L (ref 0–45)
ANION GAP SERPL CALCULATED.3IONS-SCNC: 9 MMOL/L (ref 5–18)
AST SERPL W P-5'-P-CCNC: 37 U/L (ref 0–40)
BASOPHILS # BLD AUTO: 0 10E3/UL (ref 0–0.2)
BASOPHILS NFR BLD AUTO: 1 %
BILIRUB DIRECT SERPL-MCNC: 0.1 MG/DL
BILIRUB SERPL-MCNC: 0.5 MG/DL (ref 0–1)
BUN SERPL-MCNC: 15 MG/DL (ref 8–22)
CALCIUM SERPL-MCNC: 9.6 MG/DL (ref 8.5–10.5)
CHLORIDE BLD-SCNC: 101 MMOL/L (ref 98–107)
CO2 SERPL-SCNC: 27 MMOL/L (ref 22–31)
CREAT SERPL-MCNC: 0.85 MG/DL (ref 0.6–1.1)
EOSINOPHIL # BLD AUTO: 0.1 10E3/UL (ref 0–0.7)
EOSINOPHIL NFR BLD AUTO: 2 %
ERYTHROCYTE [DISTWIDTH] IN BLOOD BY AUTOMATED COUNT: 13.1 % (ref 10–15)
FERRITIN SERPL-MCNC: 31 NG/ML (ref 10–130)
GFR SERPL CREATININE-BSD FRML MDRD: 78 ML/MIN/1.73M2
GLUCOSE BLD-MCNC: 105 MG/DL (ref 70–125)
HCT VFR BLD AUTO: 41.5 % (ref 35–47)
HGB BLD-MCNC: 13.8 G/DL (ref 11.7–15.7)
IMM GRANULOCYTES # BLD: 0 10E3/UL
IMM GRANULOCYTES NFR BLD: 0 %
IRON SATN MFR SERPL: 34 % (ref 20–50)
IRON SERPL-MCNC: 120 UG/DL (ref 42–175)
LYMPHOCYTES # BLD AUTO: 1.8 10E3/UL (ref 0.8–5.3)
LYMPHOCYTES NFR BLD AUTO: 38 %
MCH RBC QN AUTO: 28.9 PG (ref 26.5–33)
MCHC RBC AUTO-ENTMCNC: 33.3 G/DL (ref 31.5–36.5)
MCV RBC AUTO: 87 FL (ref 78–100)
MONOCYTES # BLD AUTO: 0.3 10E3/UL (ref 0–1.3)
MONOCYTES NFR BLD AUTO: 7 %
NEUTROPHILS # BLD AUTO: 2.5 10E3/UL (ref 1.6–8.3)
NEUTROPHILS NFR BLD AUTO: 52 %
PLATELET # BLD AUTO: 283 10E3/UL (ref 150–450)
POTASSIUM BLD-SCNC: 4.6 MMOL/L (ref 3.5–5)
PROT SERPL-MCNC: 6.9 G/DL (ref 6–8)
RBC # BLD AUTO: 4.77 10E6/UL (ref 3.8–5.2)
SODIUM SERPL-SCNC: 137 MMOL/L (ref 136–145)
TIBC SERPL-MCNC: 358 UG/DL (ref 313–563)
TRANSFERRIN SERPL-MCNC: 286 MG/DL (ref 212–360)
TRANSFERRIN SERPL-MCNC: 292 MG/DL (ref 212–360)
WBC # BLD AUTO: 4.7 10E3/UL (ref 4–11)

## 2021-08-04 PROCEDURE — 82248 BILIRUBIN DIRECT: CPT | Performed by: INTERNAL MEDICINE

## 2021-08-04 PROCEDURE — 82728 ASSAY OF FERRITIN: CPT | Performed by: INTERNAL MEDICINE

## 2021-08-04 PROCEDURE — 85025 COMPLETE CBC W/AUTO DIFF WBC: CPT | Performed by: INTERNAL MEDICINE

## 2021-08-04 PROCEDURE — 84466 ASSAY OF TRANSFERRIN: CPT | Performed by: INTERNAL MEDICINE

## 2021-08-04 PROCEDURE — 84466 ASSAY OF TRANSFERRIN: CPT | Mod: 59 | Performed by: INTERNAL MEDICINE

## 2021-08-04 PROCEDURE — 99215 OFFICE O/P EST HI 40 MIN: CPT | Performed by: INTERNAL MEDICINE

## 2021-08-04 PROCEDURE — 36415 COLL VENOUS BLD VENIPUNCTURE: CPT | Performed by: INTERNAL MEDICINE

## 2021-08-04 PROCEDURE — 80053 COMPREHEN METABOLIC PANEL: CPT | Performed by: INTERNAL MEDICINE

## 2021-08-04 PROCEDURE — 82306 VITAMIN D 25 HYDROXY: CPT | Performed by: INTERNAL MEDICINE

## 2021-08-04 PROCEDURE — 83540 ASSAY OF IRON: CPT | Performed by: INTERNAL MEDICINE

## 2021-08-04 RX ORDER — LIDOCAINE HYDROCHLORIDE 20 MG/ML
30 SOLUTION OROPHARYNGEAL
Qty: 100 ML | Refills: 3 | Status: SHIPPED | OUTPATIENT
Start: 2021-08-04 | End: 2021-12-08

## 2021-08-04 RX ORDER — LIDOCAINE HYDROCHLORIDE 20 MG/ML
30 SOLUTION OROPHARYNGEAL
Qty: 100 ML | Refills: 3 | Status: SHIPPED | OUTPATIENT
Start: 2021-08-04 | End: 2021-08-04

## 2021-08-04 RX ORDER — LANSOPRAZOLE 30 MG/1
CAPSULE, DELAYED RELEASE ORAL
Qty: 90 CAPSULE | Refills: 3 | Status: SHIPPED | OUTPATIENT
Start: 2021-08-04

## 2021-08-04 ASSESSMENT — MIFFLIN-ST. JEOR: SCORE: 1580.65

## 2021-08-04 NOTE — LETTER
September 21, 2021      Darlene Rhodes  [address] [place] [address]        Dear ,    We are writing to inform you of your test results.    Your vitamin D level was slightly below lower limit of normal.  I would suggest taking an over-the-counter vitamin D supplement 2000 units a day.    The ALT liver enzyme was just slightly elevated.  I doubt there is any serious problem here, but the next time you get routine blood work done, he would be a good idea to recheck the ALT.    Blood cell counts, ferritin, and iron levels normal.    Resulted Orders   Basic metabolic panel  (Ca, Cl, CO2, Creat, Gluc, K, Na, BUN)   Result Value Ref Range    Sodium 137 136 - 145 mmol/L    Potassium 4.6 3.5 - 5.0 mmol/L    Chloride 101 98 - 107 mmol/L    Carbon Dioxide (CO2) 27 22 - 31 mmol/L    Anion Gap 9 5 - 18 mmol/L    Urea Nitrogen 15 8 - 22 mg/dL    Creatinine 0.85 0.60 - 1.10 mg/dL    Calcium 9.6 8.5 - 10.5 mg/dL    Glucose 105 70 - 125 mg/dL    GFR Estimate 78 >60 mL/min/1.73m2      Comment:      As of July 11, 2021, eGFR is calculated by the CKD-EPI creatinine equation, without race adjustment. eGFR can be influenced by muscle mass, exercise, and diet. The reported eGFR is an estimation only and is only applicable if the renal function is stable.   Ferritin   Result Value Ref Range    Ferritin 31 10 - 130 ng/mL   Transferrin   Result Value Ref Range    Transferrin 292 212 - 360 mg/dL   Vitamin D Deficiency   Result Value Ref Range    Vitamin D, Total (25-Hydroxy) 29 (L) 30 - 80 ug/L    Narrative    Deficiency <10.0 ug/L  Insufficiency 10.0-29.9 ug/L  Sufficiency 30.0-80.0 ug/L  Toxicity (possible) >100.0 ug/L    Hepatic panel (Albumin, ALT, AST, Bili, Alk Phos, TP)   Result Value Ref Range    Bilirubin Total 0.5 0.0 - 1.0 mg/dL    Bilirubin Direct 0.1 <=0.5 mg/dL    Protein Total 6.9 6.0 - 8.0 g/dL    Albumin 4.0 3.5 - 5.0 g/dL    Alkaline Phosphatase 67 45 - 120 U/L    AST 37 0 - 40 U/L    ALT 50 (H) 0 - 45  U/L   CBC with platelets and differential   Result Value Ref Range    WBC Count 4.7 4.0 - 11.0 10e3/uL    RBC Count 4.77 3.80 - 5.20 10e6/uL    Hemoglobin 13.8 11.7 - 15.7 g/dL    Hematocrit 41.5 35.0 - 47.0 %    MCV 87 78 - 100 fL    MCH 28.9 26.5 - 33.0 pg    MCHC 33.3 31.5 - 36.5 g/dL    RDW 13.1 10.0 - 15.0 %    Platelet Count 283 150 - 450 10e3/uL    % Neutrophils 52 %    % Lymphocytes 38 %    % Monocytes 7 %    % Eosinophils 2 %    % Basophils 1 %    % Immature Granulocytes 0 %    Absolute Neutrophils 2.5 1.6 - 8.3 10e3/uL    Absolute Lymphocytes 1.8 0.8 - 5.3 10e3/uL    Absolute Monocytes 0.3 0.0 - 1.3 10e3/uL    Absolute Eosinophils 0.1 0.0 - 0.7 10e3/uL    Absolute Basophils 0.0 0.0 - 0.2 10e3/uL    Absolute Immature Granulocytes 0.0 <=0.0 10e3/uL   Iron binding panel   Result Value Ref Range    Iron 120 42 - 175 ug/dL    Transferrin 286 212 - 360 mg/dL    Transferrin Saturation, Calculated 34 20 - 50 %    Transferrin IBC, Calculated 358 313 - 563 ug/dL       If you have any questions or concerns, please call the clinic at the number listed above.       Sincerely,      Carmen Kerr MD

## 2021-08-04 NOTE — PROGRESS NOTES
Assessment & Plan     Gastroesophageal reflux disease without esophagitis. Patient has been having heartburn since her 20's, had a scope then which showed an esophageal ulcer. SHe was started on Prevacid and has been on this since. Has had some workup at [facility] in 2016. Has recurrent severe episodes of severe heartburn, has had 3 episodes in the last 6 weeks. She has hiatus hernia. She also has multiple stomach polyps. She is on Prevacid, and uses lidocaine viscus solution when she gets the extreme pain. Referral placed to gastroenterology.    - Basic metabolic panel  (Ca, Cl, CO2, Creat, Gluc, K, Na, BUN)  - GASTROENTEROLOGY ADULT REF CONSULT ONLY  - lidocaine (XYLOCAINE) 2 % solution  Dispense: 100 mL; Refill: 3  - Hepatic panel (Albumin, ALT, AST, Bili, Alk Phos, TP)  - Basic metabolic panel  (Ca, Cl, CO2, Creat, Gluc, K, Na, BUN)  - Hepatic panel (Albumin, ALT, AST, Bili, Alk Phos, TP)  - Iron binding panel  - Iron binding panel    HH (hiatus hernia), was told that she has a hiatus hernia.    - LANsoprazole (PREVACID) 30 MG DR capsule  Dispense: 90 capsule; Refill: 3  - GASTROENTEROLOGY ADULT REF CONSULT ONLY  - Iron binding panel  - Iron binding panel    Ulcer of esophagus without bleeding, as described above. She says she saw a doctor in the past who suggested she have stomach surgery but she is not interested in this.    - GASTROENTEROLOGY ADULT REF CONSULT ONLY  - Iron binding panel  - Iron binding panel    CYP2D6 poor metabolizer (H), this diagnosis was made at [facility]. Patient is concerned about this as she says sometimes she may not respond to pain medications. She wonders whether the current medication she takes for her heartburn is working. She had extreme chest pain last night, with radiation to her back. She was given lidocaine viscus solution in the past, and she says because of her metabolizing issues, she has to use more than other people. She takes a sip of this and it takes the edge off her  "pain.    - GASTROENTEROLOGY ADULT REF CONSULT ONLY  - Iron binding panel  - Iron binding panel    Encounter for screening mammogram for breast cancer, patient wished for me to put in referral for a mammogram. No personal history or family history of breast cancer. No previous abnormal mammograms.     - MA Screen Bilateral w/Thompson  - Iron binding panel  - Iron binding panel    Multiple gastric polyps, has been told this at previous endoscopies. Discussed referral back to gastroenterology.    - GASTROENTEROLOGY ADULT REF CONSULT ONLY  - Iron binding panel  - Iron binding panel    Anemia due to other cause, not classified, hemoglobin low at 10 as below in 2018.     - CBC with platelets and differential  - Ferritin  - Iron and iron binding capacity  - Transferrin  - CBC with platelets and differential  - Ferritin  - Transferrin  - Iron binding panel  - Iron binding panel    Menopause    - Iron binding panel  - Iron binding panel    Vitamin D deficiency    - Vitamin D Deficiency  - Vitamin D Deficiency  - Iron binding panel  - Iron binding panel        58 minutes spent on the date of the encounter doing chart review, review of outside records, review of test results, patient visit and documentation, as detailed.        BMI:   Estimated body mass index is 32.73 kg/m  as calculated from the following:    Height as of this encounter: 1.702 m (5' 7\").    Weight as of this encounter: 94.8 kg (209 lb).       MEDICATIONS:   Orders Placed This Encounter   Medications     LANsoprazole (PREVACID) 30 MG DR capsule     Sig: [LANSOPRAZOLE (PREVACID) 30 MG CAPSULE] TAKE 1 CAPSULE (30 MG TOTAL) BY MOUTH DAILY.     Dispense:  90 capsule     Refill:  3     DISCONTD: lidocaine (XYLOCAINE) 2 % solution     Sig: Swish and swallow 30 mLs in mouth every 3 hours as needed for moderate pain or pain (Heartburn) ; Max 8 doses/24 hour period.     Dispense:  100 mL     Refill:  3     I tried to find Lidocaine hydrochloride oral topical solution  " (viscous) that she has been using, but it is not in our system. Is the lidocaine viscous solution I have chosen, the correct one? Thanks. Carmen Kerr MD     lidocaine (XYLOCAINE) 2 % solution     Sig: Swish and swallow 30 mLs in mouth every 3 hours as needed for moderate pain or pain (Heartburn) ; Max 8 doses/24 hour period.     Dispense:  100 mL     Refill:  3     I tried to find Lidocaine hydrochloride oral topical solution  (viscous) that she has been using, but it is not in our system. Is the lidocaine viscous solution I have chosen, the correct one? Thanks. Carmen Kerr MD     Medications Discontinued During This Encounter   Medication Reason     metoclopramide (REGLAN) 10 MG tablet Therapy completed     lansoprazole (PREVACID) 30 MG capsule Reorder     lidocaine (XYLOCAINE) 2 % solution           - Continue other medications without change  There are no Patient Instructions on file for this visit.    Return in about 6 weeks (around 9/15/2021), or PHysical, 40 minutes please. Pap. Can take meds, and water, fast 12 hours.    Carmen Kerr MD  [facility]    Desmond Colon is a 55 year old who presents for the following health issues, has come today by herself.    HPI   Patient is here to establish primary care.  Looks like the patient has been seen by family practice, through the [facility] system, August 2, 2018 was the last visit.    Upper GI double Contrast with KUB 11/11/2016:  1. Small sliding hiatal hernia.   2. Multiple gastric polyps consistent with cystic fundic gland polyps.     FINDINGS: Esophageal peristalsis is intact. No esophageal abnormality. Small sliding hiatal hernia. No gastroesophageal reflux was witnessed.     Multiple polyps in the stomach. The stomach and duodenum are otherwise unremarkable.      11/10/2016:  INDICATION:  Abdominal pain     FINDINGS:  Percent emptied from the stomach following a technetium labeled meal  "was:     1 hour 23% (normal 7 - 27%)   2 hour 42% (normal 31 - 67%)   4 hour 78% (normal 81 - 100%)     The patient ate all of the meal.     TECHNIQUE:  Radionuclide gastric emptying study with imaging to 4 hours.          Results for BROOKS PARSONS (MRN [id number]) as of 8/4/2021 13:27   Ref. Range 8/1/2018 11:01   Ferritin Latest Ref Range: 10 - 130 ng/mL 4 (L)   Iron Latest Ref Range: 42 - 175 ug/dL 51   Transferrin Latest Ref Range: 212 - 360 mg/dL 323   Transferrin IBC Latest Ref Range: 313 - 563 ug/dL 404   Transferrin Saturation Latest Ref Range: 20 - 50 % 13 (L)   WBC Latest Ref Range: 4.0 - 11.0 thou/uL 4.0   Hemoglobin Latest Ref Range: 12.0 - 16.0 g/dL 10.5 (L)   Hematocrit Latest Ref Range: 35.0 - 47.0 % 32.7 (L)   Platelet Count Latest Ref Range: 140 - 440 thou/uL 411       No past medical history on file.    Past Surgical History:   Procedure Laterality Date     MO RX ECTOP PREG BY SCOPE,RMV TUBE/OVRY      Description: Laparoscopic Excision Of Ectopic Pregnancy And Salpingectomy;  Recorded: 08/31/2010;     Family History   Problem Relation Age of Onset     Anuerysm Paternal Grandmother         brain     Prostate Cancer Paternal Grandfather    No family history of cardiac disease or cancer except as above.     Social History     Tobacco Use     Smoking status: Never Smoker     Smokeless tobacco: Never Used   Substance Use Topics     Alcohol use: Yes     Comment: Alcoholic Drinks/day: 1-2 / week     Drug use: None         Review of Systems   Rest of the review of systems is negative.      Objective    /88 (BP Location: Right arm, Patient Position: Sitting)   Pulse 83   Ht 1.702 m (5' 7\")   Wt 94.8 kg (209 lb)   SpO2 98%   BMI 32.73 kg/m    Body mass index is 32.73 kg/m .  Physical Exam   Patient is interactive and alert, and able to tell me her story. Chest is clear, normal heart sounds, no murmurs, no peripheral edema.    "

## 2021-08-05 LAB — DEPRECATED CALCIDIOL+CALCIFEROL SERPL-MC: 29 UG/L (ref 30–80)

## 2021-08-24 ENCOUNTER — VIRTUAL VISIT (OUTPATIENT)
Dept: GASTROENTEROLOGY | Facility: CLINIC | Age: 56
End: 2021-08-24
Payer: COMMERCIAL

## 2021-08-24 DIAGNOSIS — K22.10 ULCER OF ESOPHAGUS WITHOUT BLEEDING: ICD-10-CM

## 2021-08-24 DIAGNOSIS — K31.7 MULTIPLE GASTRIC POLYPS: ICD-10-CM

## 2021-08-24 DIAGNOSIS — K21.9 GASTROESOPHAGEAL REFLUX DISEASE WITHOUT ESOPHAGITIS: ICD-10-CM

## 2021-08-24 DIAGNOSIS — E88.89 CYP2D6 POOR METABOLIZER (H): ICD-10-CM

## 2021-08-24 DIAGNOSIS — K44.9 HH (HIATUS HERNIA): ICD-10-CM

## 2021-08-24 PROCEDURE — 99204 OFFICE O/P NEW MOD 45 MIN: CPT | Mod: 95 | Performed by: INTERNAL MEDICINE

## 2021-08-24 NOTE — PROGRESS NOTES
Darlene is a 55 year old who is being evaluated via a billable video visit.      How would you like to obtain your AVS? Mail a copy  If the video visit is dropped, the invitation should be resent by: Send to e-mail at: [email or web address]  Will anyone else be joining your video visit? No      Video Start Time:   Video-Visit Details    Type of service:  Video Visit    Video End Time:    Originating Location (pt. Location):     Distant Location (provider location):  [facility]     Platform used for Video Visit:   Alvarez Molina CMA

## 2021-08-24 NOTE — PROGRESS NOTES
GASTROENTEROLOGY NEW PATIENT VIDEO VISIT    CC/REFERRING MD:    Carmen Kerr    REASON FOR CONSULTATION:   Carmen Kerr for   Chief Complaint   Patient presents with     New Patient     Gerd/ constant pain // takes prevacid // past espohageal ulcer //hiatal hernia       HISTORY OF PRESENT ILLNESS:    Darlene Rhodes is a 55 year old female who is being evaluated via a billable video visit.      Did an evaluation for reflux today.  She notes GERD was diagnosed age 20s.  She was having chest pain and reflux and she reports that endoscopy noted esophageal ulcer.  She has been on Prevacid long-term since then.  She did have upper endoscopy in 2009 and also had extensive evaluation by [facility] in 2016.  Then she had upper endoscopy which noted a small hiatal hernia, upper GI barium study which again noted small hiatal hernia and normal peristalsis, video swallow which was normal, normal gastric emptying scan.  Upper endoscopy also noted fundic gland polyps.  She had colonoscopy and that was reported to be normal.  She notes that she continues to have intermittent reflux and chest pain.  She uses lidocaine viscus as needed and this helps.  She continues with PPI therapy.  She has tried to get off PPI therapy and that she has worse reflux symptoms.  She reports that she is a poor CYP2D6 metabolizer and so she oftentimes needs higher doses of medications.  She does not use other over-the-counter medications such as ibuprofen.  There is no family history of esophageal cancer.  Her symptoms of acid reflux have not changed over the years but continue to cause symptoms, especially if she does not take acid reducing medication.  There is no known family history of esophageal cancer.    I have reviewed and updated the patient's Past Medical History, Social History, Family History and Medication List.    Exam:    General appearance:  Healthy appearing adult, in no acute  distress  Eyes:  Sclera anicteric, Pupils round and reactive to light  Ears, nose, mouth and throat:  No obvious external lesions of ears and nose.  Hearing intact  Neck:  Symmetric, No obvious external lesions  Respiratory:  Normal respiration, no use of accessory muscles   MSK:  No visual upper extremity, neck or facial muscle atrophy  ABD:  No visual abdominal distention, no audible borborygmi  Skin:  No rashes or jaundice   Psychiatric:  Oriented to person, place and time, Appropriate mood and affect.   Neurologic:  Peripheral muscle function and dexterity appear to be intact      PERTINENT STUDIES have been reviewed.    ASSESSMENT/PLAN:    Darlene Rhodes is a 55 year old female who presents for evaluation of GERD.  She has fairly classic GERD in the setting of a small hiatal hernia which is responsive to PPI therapy.  She has had extensive evaluation in the past with upper endoscopy, upper diet, the small and barium study.  She has not had pH testing though I do not think that would add much at this point.  We talked about lifestyle modification including avoidance of food triggers, avoid alcohol, avoid caffeine, avoid nicotine, do not eat late at night, elevate head of bed, small meals etc.  We also talked about chronic medical management with PPI therapy.  She has tried to get off PPI therapy in the past and is already on the lowest effective dose.  Given that she response to PPI therapy, I do not think that antireflux surgery would benefit her at this point.  She does not have any red flag symptoms such as dysphagia, PPI nonresponse, unexplained weight loss etc.  She can continue her current plan of care.  If she has any of those concerning symptoms in the future or symptoms should change, then we could consider another upper endoscopy as next up a evaluation.  She is in agreement with the plan.      Video-Visit Details    Type of service:  Video Visit    Total Video Time: 24    Originating Location (pt.  Location): Home    Distant Location (provider location):  [facility]     Mode of Communication:  Video Conference via [employer]    Lazaro Gan MD    RTC PRN    Thank you for this consultation.  It was a pleasure to participate in the care of this patient; please contact us with any further questions.  A total of 45 minutes was spent with reviewing the chart, discussing with the patient, documentation and coordination of care.    This note was created with voice recognition software, and while reviewed for accuracy, typos may remain.     Lazaro Gan MD  Division of Gastroenterology, Hepatology and Nutrition  [facility]  [phone]

## 2021-08-30 ENCOUNTER — ANCILLARY PROCEDURE (OUTPATIENT)
Dept: MAMMOGRAPHY | Facility: CLINIC | Age: 56
End: 2021-08-30
Attending: INTERNAL MEDICINE
Payer: COMMERCIAL

## 2021-08-30 DIAGNOSIS — Z12.31 ENCOUNTER FOR SCREENING MAMMOGRAM FOR BREAST CANCER: ICD-10-CM

## 2021-08-30 PROCEDURE — 77063 BREAST TOMOSYNTHESIS BI: CPT | Mod: TC | Performed by: INTERNAL MEDICINE

## 2021-08-30 PROCEDURE — 77067 SCR MAMMO BI INCL CAD: CPT | Mod: TC | Performed by: INTERNAL MEDICINE

## 2021-08-30 NOTE — LETTER
August 30, 2021      Darlene Rhodes  [address] [place] [address]        Dear ,    We are writing to inform you of your test results.    Your mammogram is normal.    Resulted Orders   MA Screen Bilateral w/Thompson    Narrative    BILATERAL FULL FIELD DIGITAL SCREENING MAMMOGRAM WITH TOMOSYNTHESIS    Performed on: 8/30/21    Compared to: 03/21/2013, 02/14/2011, and 02/18/2009    Technique:  This study was evaluated with the assistance of Computer-Aided   Detection.  Breast Tomosynthesis was used in interpretation.    Findings: The breasts have scattered areas of fibroglandular density.    There is no radiographic evidence of malignancy.     IMPRESSION: ACR BI-RADS Category 1: Negative    RECOMMENDED FOLLOW-UP: Annual routine screening mammogram    The results and recommendations of this examination will be communicated   to the patient.           If you have any questions or concerns, please call the clinic at the number listed above.       Sincerely,      Carmen Kerr MD

## 2021-12-08 ENCOUNTER — OFFICE VISIT (OUTPATIENT)
Dept: INTERNAL MEDICINE | Facility: CLINIC | Age: 56
End: 2021-12-08
Payer: COMMERCIAL

## 2021-12-08 VITALS
OXYGEN SATURATION: 97 % | HEIGHT: 67 IN | DIASTOLIC BLOOD PRESSURE: 70 MMHG | HEART RATE: 80 BPM | SYSTOLIC BLOOD PRESSURE: 100 MMHG | WEIGHT: 210 LBS | BODY MASS INDEX: 32.96 KG/M2

## 2021-12-08 DIAGNOSIS — K21.9 GASTROESOPHAGEAL REFLUX DISEASE WITHOUT ESOPHAGITIS: ICD-10-CM

## 2021-12-08 DIAGNOSIS — R94.5 ABNORMAL RESULTS OF LIVER FUNCTION STUDIES: ICD-10-CM

## 2021-12-08 DIAGNOSIS — R63.5 WEIGHT GAIN: ICD-10-CM

## 2021-12-08 DIAGNOSIS — K44.9 HH (HIATUS HERNIA): ICD-10-CM

## 2021-12-08 DIAGNOSIS — N93.9 VAGINAL BLEEDING: ICD-10-CM

## 2021-12-08 DIAGNOSIS — Z78.0 MENOPAUSE: ICD-10-CM

## 2021-12-08 DIAGNOSIS — E55.9 VITAMIN D DEFICIENCY: ICD-10-CM

## 2021-12-08 DIAGNOSIS — Z13.220 LIPID SCREENING: ICD-10-CM

## 2021-12-08 DIAGNOSIS — L98.9 SKIN LESION: ICD-10-CM

## 2021-12-08 DIAGNOSIS — Z00.00 ENCOUNTER FOR PREVENTIVE CARE: Primary | ICD-10-CM

## 2021-12-08 LAB
ALBUMIN SERPL-MCNC: 4 G/DL (ref 3.5–5)
ALP SERPL-CCNC: 75 U/L (ref 45–120)
ALT SERPL W P-5'-P-CCNC: 71 U/L (ref 0–45)
AST SERPL W P-5'-P-CCNC: 58 U/L (ref 0–40)
BILIRUB DIRECT SERPL-MCNC: 0.2 MG/DL
BILIRUB SERPL-MCNC: 0.8 MG/DL (ref 0–1)
CHOLEST SERPL-MCNC: 256 MG/DL
ERYTHROCYTE [DISTWIDTH] IN BLOOD BY AUTOMATED COUNT: 12.5 % (ref 10–15)
FASTING STATUS PATIENT QL REPORTED: YES
FASTING STATUS PATIENT QL REPORTED: YES
GLUCOSE BLD-MCNC: 97 MG/DL (ref 70–125)
HCT VFR BLD AUTO: 40.7 % (ref 35–47)
HDLC SERPL-MCNC: 42 MG/DL
HGB BLD-MCNC: 13.5 G/DL (ref 11.7–15.7)
LDLC SERPL CALC-MCNC: 174 MG/DL
MCH RBC QN AUTO: 29.4 PG (ref 26.5–33)
MCHC RBC AUTO-ENTMCNC: 33.2 G/DL (ref 31.5–36.5)
MCV RBC AUTO: 89 FL (ref 78–100)
PLATELET # BLD AUTO: 287 10E3/UL (ref 150–450)
PROT SERPL-MCNC: 6.7 G/DL (ref 6–8)
RBC # BLD AUTO: 4.59 10E6/UL (ref 3.8–5.2)
TRIGL SERPL-MCNC: 200 MG/DL
TSH SERPL DL<=0.005 MIU/L-ACNC: 1.3 UIU/ML (ref 0.3–5)
WBC # BLD AUTO: 3.8 10E3/UL (ref 4–11)

## 2021-12-08 PROCEDURE — 80076 HEPATIC FUNCTION PANEL: CPT | Performed by: INTERNAL MEDICINE

## 2021-12-08 PROCEDURE — 80061 LIPID PANEL: CPT | Performed by: INTERNAL MEDICINE

## 2021-12-08 PROCEDURE — 84443 ASSAY THYROID STIM HORMONE: CPT | Performed by: INTERNAL MEDICINE

## 2021-12-08 PROCEDURE — 36415 COLL VENOUS BLD VENIPUNCTURE: CPT | Performed by: INTERNAL MEDICINE

## 2021-12-08 PROCEDURE — 82306 VITAMIN D 25 HYDROXY: CPT | Performed by: INTERNAL MEDICINE

## 2021-12-08 PROCEDURE — 85027 COMPLETE CBC AUTOMATED: CPT | Performed by: INTERNAL MEDICINE

## 2021-12-08 PROCEDURE — 99396 PREV VISIT EST AGE 40-64: CPT | Performed by: INTERNAL MEDICINE

## 2021-12-08 PROCEDURE — 82947 ASSAY GLUCOSE BLOOD QUANT: CPT | Performed by: INTERNAL MEDICINE

## 2021-12-08 PROCEDURE — 99214 OFFICE O/P EST MOD 30 MIN: CPT | Mod: 25 | Performed by: INTERNAL MEDICINE

## 2021-12-08 RX ORDER — LIDOCAINE HYDROCHLORIDE 20 MG/ML
30 SOLUTION OROPHARYNGEAL
Qty: 100 ML | Refills: 3 | Status: SHIPPED | OUTPATIENT
Start: 2021-12-08 | End: 2022-12-19

## 2021-12-08 RX ORDER — LIDOCAINE HYDROCHLORIDE 20 MG/ML
30 SOLUTION OROPHARYNGEAL
Qty: 100 ML | Refills: 3 | Status: SHIPPED | OUTPATIENT
Start: 2021-12-08 | End: 2021-12-08

## 2021-12-08 ASSESSMENT — MIFFLIN-ST. JEOR: SCORE: 1580.18
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
## 2021-12-08 NOTE — LETTER
January 5, 2022      Darlene Rhodes  [address] [place] [address]        Dear ,    We are writing to inform you of your test results.    Your liver enzymes are slightly abnormal, and your AST is 58, normal is up to 40.  Your ALT is 71, normal is up to 45.  There can be a number of reasons for this.  If you are drinking alcohol, cut back on the amount of alcohol you drink.  I will follow up, with repeat blood test in future.    Your bad cholesterol the LDL is 174 which is increased [normal is up to 130].  This is better than it was in the past, it was as high as 215, 10 years ago. I see that you are not on any medications for this.  This is okay for now, I will repeat fasting blood test in future.    Resulted Orders   Lipid Profile (Chol, Trig, HDL, LDL calc)   Result Value Ref Range    Cholesterol 256 (H) <=199 mg/dL    Triglycerides 200 (H) <=149 mg/dL    Direct Measure HDL 42 (L) >=50 mg/dL      Comment:      HDL Cholesterol Reference Range:     0-2 years:   No reference ranges established for patients under 2 years old  [facility].    2-8 years:  Greater than 45 mg/dL     18 years and older:   Female: Greater than or equal to 50 mg/dL   Male:   Greater than or equal to 40 mg/dL    LDL Cholesterol Calculated 174 (H) <=129 mg/dL    Patient Fasting > 8hrs? Yes    Hepatic panel (Albumin, ALT, AST, Bili, Alk Phos, TP)   Result Value Ref Range    Bilirubin Total 0.8 0.0 - 1.0 mg/dL    Bilirubin Direct 0.2 <=0.5 mg/dL    Protein Total 6.7 6.0 - 8.0 g/dL    Albumin 4.0 3.5 - 5.0 g/dL    Alkaline Phosphatase 75 45 - 120 U/L    AST 58 (H) 0 - 40 U/L    ALT 71 (H) 0 - 45 U/L   Glucose   Result Value Ref Range    Glucose 97 70 - 125 mg/dL    Patient Fasting > 8hrs? Yes    CBC with platelets   Result Value Ref Range    WBC Count 3.8 (L) 4.0 - 11.0 10e3/uL    RBC Count 4.59 3.80 - 5.20 10e6/uL    Hemoglobin 13.5 11.7 - 15.7 g/dL    Hematocrit 40.7 35.0 - 47.0 %    MCV 89 78  - 100 fL    MCH 29.4 26.5 - 33.0 pg    MCHC 33.2 31.5 - 36.5 g/dL    RDW 12.5 10.0 - 15.0 %    Platelet Count 287 150 - 450 10e3/uL   Vitamin D deficiency screening   Result Value Ref Range    Vitamin D, Total (25-Hydroxy) 26 (L) 30 - 80 ug/L    Narrative    Deficiency <10.0 ug/L  Insufficiency 10.0-29.9 ug/L  Sufficiency 30.0-80.0 ug/L  Toxicity (possible) >100.0 ug/L    TSH with free T4 reflex   Result Value Ref Range    TSH 1.30 0.30 - 5.00 uIU/mL       If you have any questions or concerns, please call the clinic at the number listed above.       Sincerely,      Carmen Kerr MD

## 2021-12-09 LAB — DEPRECATED CALCIDIOL+CALCIFEROL SERPL-MC: 26 UG/L (ref 30–80)

## 2022-04-12 ENCOUNTER — OFFICE VISIT (OUTPATIENT)
Dept: OBGYN | Facility: CLINIC | Age: 57
End: 2022-04-12
Payer: COMMERCIAL

## 2022-04-12 VITALS
SYSTOLIC BLOOD PRESSURE: 132 MMHG | HEART RATE: 79 BPM | BODY MASS INDEX: 33.43 KG/M2 | DIASTOLIC BLOOD PRESSURE: 80 MMHG | HEIGHT: 67 IN | WEIGHT: 213 LBS | OXYGEN SATURATION: 98 %

## 2022-04-12 DIAGNOSIS — K21.9 GASTROESOPHAGEAL REFLUX DISEASE WITHOUT ESOPHAGITIS: ICD-10-CM

## 2022-04-12 DIAGNOSIS — Z78.0 MENOPAUSE: Primary | ICD-10-CM

## 2022-04-12 DIAGNOSIS — Z12.4 CERVICAL CANCER SCREENING: ICD-10-CM

## 2022-04-12 DIAGNOSIS — R79.89 ELEVATED LIVER FUNCTION TESTS: ICD-10-CM

## 2022-04-12 PROCEDURE — G0123 SCREEN CERV/VAG THIN LAYER: HCPCS | Performed by: OBSTETRICS & GYNECOLOGY

## 2022-04-12 PROCEDURE — 87624 HPV HI-RISK TYP POOLED RSLT: CPT | Performed by: OBSTETRICS & GYNECOLOGY

## 2022-04-12 PROCEDURE — 99204 OFFICE O/P NEW MOD 45 MIN: CPT | Performed by: OBSTETRICS & GYNECOLOGY

## 2022-04-12 NOTE — PROGRESS NOTES
"CC: Darlene Rhodes is here secondary to questions about menopause.    HPI: The pt is a 56 year old MWF  who presents with likely menopause in 2020.  Her periods before that were in July and 2019.  She had a couple days of very light spotting in the summer of , but she's had no bleeding since.  She doesn't know how old her mother was at the time of menopause, and her mother is now .  She has been having hot flashes for a couple years, although for the most part they have improved overall.  She does feel like her \"baseline thermostat\" has gotten warmer than in the past.  She has ongoing GERD issues with a history of an esophageal ulcer in her mid 20s.  She's been on a PPI since then; she is starting to have some LFT elevations.  She notes that if she doesn't take her PPI in the morning, by mid afternoon she is having significant reflux symptoms with pain.  She does some exercise on an elliptical.  She hasn't had a Pap smear since .  She had a mammogram last Aug.    Past Medical History:   Diagnosis Date     Gastroesophageal reflux disease with esophagitis        Past Surgical History:   Procedure Laterality Date     FOOT SURGERY Right     bone spur removal     [facility] RX ECTOP PREG BY SCOPE,RMV TUBE/OVRY      Description: Laparoscopic Excision Of Ectopic Pregnancy And Salpingectomy;  Recorded: 2010;       Patient's   Family History   Problem Relation Age of Onset     No Known Problems Mother      No Known Problems Father      No Known Problems Sister      No Known Problems Sister      No Known Problems Sister      No Known Problems Maternal Grandmother      No Known Problems Maternal Grandfather      Anuerysm Paternal Grandmother         brain     Prostate Cancer Paternal Grandfather      Heart Defect Daughter         congenital septal defects     Lymphoma Daughter 25     No Known Problems Daughter        Patient   Social History     Socioeconomic History     Marital status: " "     Spouse name: None     Number of children: None     Years of education: None     Highest education level: None   Tobacco Use     Smoking status: Never Smoker     Smokeless tobacco: Never Used   Substance and Sexual Activity     Alcohol use: Yes     Comment: Alcoholic Drinks/day: 1-2 / week     Sexual activity: Yes     Partners: Male       Outpatient Medications Prior to Visit   Medication Sig Dispense Refill     LANsoprazole (PREVACID) 30 MG DR capsule [LANSOPRAZOLE (PREVACID) 30 MG CAPSULE] TAKE 1 CAPSULE (30 MG TOTAL) BY MOUTH DAILY. 90 capsule 3     lidocaine, viscous, (XYLOCAINE) 2 % solution Swish and swallow 30 mLs in mouth every 3 hours as needed for moderate pain or pain (Heartburn) ; Max 8 doses/24 hour period. 100 mL 3     No facility-administered medications prior to visit.       Patient is allergic to animal dander, grass, hydrocodone-acetaminophen, hydromorphone, hydroxyzine, iodinated contrast media [diagnostic x-ray materials], morphine, ondansetron, and oxycodone-acetaminophen.    ROS:  12 part ROS is negative aside from those symptoms in the HPI    PE:  /80 (BP Location: Right arm, Patient Position: Sitting, Cuff Size: Adult Regular)   Pulse 79   Ht 1.702 m (5' 7\")   Wt 96.6 kg (213 lb)           Body mass index is 33.36 kg/m .    General: obese WF, NAD  Pelvic: EG/BUS no lesions, no skin change   Vagina no lesions, no discharge   Cervix no lesions, no cervical motion tenderness   Uterus AV, NT, mobile, no masses   Adnexa NT, no masses bilaterally, mobile   Perineum no lesions   Rectal deferred  Psych: normal mood  Neuro: CN I-XII grossly intact  MS: normal gait    Assessment: 56 year old MWF  with a normal exam, menopausal.    Plan: Natural history of menopause discussed with the patient.  We discussed possible changes over time, including vaginal atrophy.  We discussed health risks including heart disease and bone loss.  We discussed calcium and vitamin D.  We discussed " her GI issues.  With the LFT elevation and long term PPI use, referral to GI was made to see if there are other options for her.  We discussed that some of her symptoms if she forgets to take the PPI may be rebound.  We discussed that if the Pap and HPV today are normal, unless she has a change in sexual partner, she should have follow up Pap/HPV in 5 years.  Questions were answered to the best of my ability.    48 minutes spent on the date of the encounter doing chart review, review of test results, patient visit and documentation

## 2022-04-15 LAB
HUMAN PAPILLOMA VIRUS 16 DNA: NEGATIVE
HUMAN PAPILLOMA VIRUS 18 DNA: NEGATIVE
HUMAN PAPILLOMA VIRUS FINAL DIAGNOSIS: ABNORMAL
HUMAN PAPILLOMA VIRUS OTHER HR: POSITIVE

## 2022-04-18 LAB
BKR LAB AP GYN ADEQUACY: NORMAL
BKR LAB AP GYN INTERPRETATION: NORMAL
BKR LAB AP GYN OTHER FINDINGS: NORMAL
BKR LAB AP HPV REFLEX: NORMAL
BKR LAB AP PREVIOUS ABNORMAL: NORMAL
PATH REPORT.COMMENTS IMP SPEC: NORMAL
PATH REPORT.COMMENTS IMP SPEC: NORMAL
PATH REPORT.RELEVANT HX SPEC: NORMAL

## 2022-04-20 ENCOUNTER — PATIENT OUTREACH (OUTPATIENT)
Dept: OBGYN | Facility: CLINIC | Age: 57
End: 2022-04-20
Payer: COMMERCIAL

## 2022-05-02 DIAGNOSIS — R79.89 ELEVATED LFTS: Primary | ICD-10-CM

## 2022-05-02 NOTE — PROGRESS NOTES
Labs being ordered for upcoming hepatology visit per standing order protocol. Lab appointment has been scheduled.

## 2022-05-05 NOTE — TELEPHONE ENCOUNTER
RECORDS RECEIVED FROM: Internal   Appt Date: 05.19.2022   NOTES STATUS DETAILS   OFFICE NOTE from referring provider Internal 04.12.2022 Beverly Haney   OFFICE NOTES from other specialists Internal  12.08.2021 Carmen Kerr MD   DISCHARGE SUMMARY from hospital     MEDICATION LIST Internal    LIVER BIOSPY (IF APPLICABLE)      PATHOLOGY REPORTS      IMAGING     ENDOSCOPY (IF AVAILABLE)     COLONOSCOPY (IF AVAILABLE)     ULTRASOUND LIVER     CT OF ABDOMEN     MRI OF LIVER     FIBROSCAN, US ELASTOGRAPHY, FIBROSIS SCAN, MR ELASTOGRAPHY     LABS     HEPATIC PANEL (LIVER PANEL) Internal 12.08.2021   BASIC METABOLIC PANEL Internal 08.04.2021   COMPLETE METABOLIC PANEL Internal 12.08.2021   COMPLETE BLOOD COUNT (CBC) Internal 12.085.2021   INTERNATIONAL NORMALIZED RATIO (INR)     HEPATITIS C ANTIBODY     HEPATITIS C VIRAL LOAD/PCR     HEPATITIS C GENOTYPE     HEPATITIS B SURFACE ANTIGEN     HEPATITIS B SURFACE ANTIBODY     HEPATITIS B DNA QUANT LEVEL     HEPATITIS B CORE ANTIBODY

## 2022-05-19 ENCOUNTER — TRANSFERRED RECORDS (OUTPATIENT)
Dept: HEALTH INFORMATION MANAGEMENT | Facility: CLINIC | Age: 57
End: 2022-05-19

## 2022-05-19 ENCOUNTER — PRE VISIT (OUTPATIENT)
Dept: GASTROENTEROLOGY | Facility: CLINIC | Age: 57
End: 2022-05-19

## 2022-05-19 ENCOUNTER — LAB (OUTPATIENT)
Dept: LAB | Facility: CLINIC | Age: 57
End: 2022-05-19
Attending: INTERNAL MEDICINE

## 2022-05-19 ENCOUNTER — LAB (OUTPATIENT)
Dept: LAB | Facility: CLINIC | Age: 57
End: 2022-05-19
Attending: INTERNAL MEDICINE
Payer: COMMERCIAL

## 2022-05-19 ENCOUNTER — OFFICE VISIT (OUTPATIENT)
Dept: GASTROENTEROLOGY | Facility: CLINIC | Age: 57
End: 2022-05-19
Attending: OBSTETRICS & GYNECOLOGY
Payer: COMMERCIAL

## 2022-05-19 ENCOUNTER — TELEPHONE (OUTPATIENT)
Dept: GASTROENTEROLOGY | Facility: CLINIC | Age: 57
End: 2022-05-19

## 2022-05-19 VITALS — BODY MASS INDEX: 33.78 KG/M2 | HEART RATE: 87 BPM | WEIGHT: 215.7 LBS | OXYGEN SATURATION: 97 %

## 2022-05-19 DIAGNOSIS — K21.9 GASTROESOPHAGEAL REFLUX DISEASE WITHOUT ESOPHAGITIS: ICD-10-CM

## 2022-05-19 DIAGNOSIS — R79.89 ELEVATED LIVER FUNCTION TESTS: ICD-10-CM

## 2022-05-19 DIAGNOSIS — R79.89 ELEVATED LFTS: ICD-10-CM

## 2022-05-19 LAB
ALBUMIN SERPL-MCNC: 3.8 G/DL (ref 3.4–5)
ALP SERPL-CCNC: 72 U/L (ref 40–150)
ALT SERPL W P-5'-P-CCNC: 104 U/L (ref 0–50)
ANION GAP SERPL CALCULATED.3IONS-SCNC: 6 MMOL/L (ref 3–14)
AST SERPL W P-5'-P-CCNC: 50 U/L (ref 0–45)
BILIRUB DIRECT SERPL-MCNC: <0.1 MG/DL (ref 0–0.2)
BILIRUB SERPL-MCNC: 0.4 MG/DL (ref 0.2–1.3)
BUN SERPL-MCNC: 11 MG/DL (ref 7–30)
CALCIUM SERPL-MCNC: 9.5 MG/DL (ref 8.5–10.1)
CHLORIDE BLD-SCNC: 105 MMOL/L (ref 94–109)
CO2 SERPL-SCNC: 27 MMOL/L (ref 20–32)
CREAT SERPL-MCNC: 0.84 MG/DL (ref 0.52–1.04)
ERYTHROCYTE [DISTWIDTH] IN BLOOD BY AUTOMATED COUNT: 13.4 % (ref 10–15)
GFR SERPL CREATININE-BSD FRML MDRD: 82 ML/MIN/1.73M2
GLUCOSE BLD-MCNC: 112 MG/DL (ref 70–99)
HBV SURFACE AB SERPL IA-ACNC: 64.63 M[IU]/ML
HBV SURFACE AG SERPL QL IA: NONREACTIVE
HCT VFR BLD AUTO: 42.4 % (ref 35–47)
HGB BLD-MCNC: 13.8 G/DL (ref 11.7–15.7)
INR PPP: 0.88 (ref 0.85–1.15)
MCH RBC QN AUTO: 28.8 PG (ref 26.5–33)
MCHC RBC AUTO-ENTMCNC: 32.5 G/DL (ref 31.5–36.5)
MCV RBC AUTO: 89 FL (ref 78–100)
PLATELET # BLD AUTO: 291 10E3/UL (ref 150–450)
POTASSIUM BLD-SCNC: 4 MMOL/L (ref 3.4–5.3)
PROT SERPL-MCNC: 7.5 G/DL (ref 6.8–8.8)
RBC # BLD AUTO: 4.79 10E6/UL (ref 3.8–5.2)
SODIUM SERPL-SCNC: 138 MMOL/L (ref 133–144)
WBC # BLD AUTO: 5.4 10E3/UL (ref 4–11)

## 2022-05-19 PROCEDURE — 80053 COMPREHEN METABOLIC PANEL: CPT | Performed by: PATHOLOGY

## 2022-05-19 PROCEDURE — 99000 SPECIMEN HANDLING OFFICE-LAB: CPT | Performed by: PATHOLOGY

## 2022-05-19 PROCEDURE — 85027 COMPLETE CBC AUTOMATED: CPT | Performed by: PATHOLOGY

## 2022-05-19 PROCEDURE — 99214 OFFICE O/P EST MOD 30 MIN: CPT | Mod: 25 | Performed by: INTERNAL MEDICINE

## 2022-05-19 PROCEDURE — 36415 COLL VENOUS BLD VENIPUNCTURE: CPT | Performed by: PATHOLOGY

## 2022-05-19 PROCEDURE — 85610 PROTHROMBIN TIME: CPT | Performed by: PATHOLOGY

## 2022-05-19 PROCEDURE — 86803 HEPATITIS C AB TEST: CPT | Mod: 90 | Performed by: PATHOLOGY

## 2022-05-19 PROCEDURE — 87340 HEPATITIS B SURFACE AG IA: CPT | Mod: 90 | Performed by: PATHOLOGY

## 2022-05-19 PROCEDURE — 82248 BILIRUBIN DIRECT: CPT | Performed by: PATHOLOGY

## 2022-05-19 PROCEDURE — 91200 LIVER ELASTOGRAPHY: CPT | Mod: 26 | Performed by: PHYSICIAN ASSISTANT

## 2022-05-19 PROCEDURE — 86015 ACTIN ANTIBODY EACH: CPT | Mod: 90 | Performed by: PATHOLOGY

## 2022-05-19 PROCEDURE — 86706 HEP B SURFACE ANTIBODY: CPT | Mod: 90 | Performed by: PATHOLOGY

## 2022-05-19 NOTE — PROGRESS NOTES
[facility]       Hepatology Clinic    Date of Service: 5/19/2022     Referring Provider: Dr. Haney      History of Present Illness    Ms. Rhodes is sent in consultation by Dr. Haney because of abnormal transaminases.  The patient recalls no prior    The patient reports no prior knowledge of liver problems.  She has never had hepatitis.  There is no family history of liver disease.  She has about 2 alcoholic beverages per week.      She has noted significant weight gain over the last few years.    She has a history of GERD and has seen Dr. Gan for this (see his note).  Her symptoms are well controlled on a PPI.  The remainder of her GI, hepatic, and constitutional ROS are negative.      Past Medical History:  Past Medical History:   Diagnosis Date     Cervical high risk HPV (human papillomavirus) test positive 04/12/2022 04/12/22     Gastroesophageal reflux disease with esophagitis        Patient Active Problem List   Diagnosis     CYP2D6 poor metabolizer (H)     Ulcer of esophagus without bleeding     HH (hiatus hernia)     Gastroesophageal reflux disease without esophagitis     Multiple gastric polyps     Cervical high risk HPV (human papillomavirus) test positive       Surgical History:  Past Surgical History:   Procedure Laterality Date     FOOT SURGERY Right     bone spur removal     [place] RX ECTOP PREG BY SCOPE,RMV TUBE/OVRY      Description: Laparoscopic Excision Of Ectopic Pregnancy And Salpingectomy;  Recorded: 08/31/2010;       Social History:  Social History     Tobacco Use     Smoking status: Never Smoker     Smokeless tobacco: Never Used   Substance Use Topics     Alcohol use: Yes     Comment: Alcoholic Drinks/day: 1-2 / week       Family History:  Family History   Problem Relation Age of Onset     No Known Problems Mother      No Known Problems Father      No Known Problems Sister      No Known Problems Sister      No Known Problems Sister      No Known  Problems Maternal Grandmother      No Known Problems Maternal Grandfather      Anuerysm Paternal Grandmother         brain     Prostate Cancer Paternal Grandfather      Heart Defect Daughter         congenital septal defects     Lymphoma Daughter 25     No Known Problems Daughter        Medications:  Current Outpatient Medications   Medication     LANsoprazole (PREVACID) 30 MG DR capsule     lidocaine, viscous, (XYLOCAINE) 2 % solution     loratadine-pseudoePHEDrine (CLARITIN-D 24-HOUR)  MG 24 hr tablet     No current facility-administered medications for this visit.       Review of Systems    A complete 10 point review of systems was asked and answered in the negative unless specifically commented upon in the HPI    Objective:         Vitals:    05/19/22 1231   Pulse: 87   SpO2: 97%   Weight: 97.8 kg (215 lb 11.2 oz)     Body mass index is 33.78 kg/m .     Exam  NAD, appears healthy.  Skin - no spider angiomata.  Abd - soft, NT, no HSM  Neuro - normal mentation.    Labs and Diagnostic tests:  Lab Results   Component Value Date     05/19/2022    POTASSIUM 4.0 05/19/2022    CHLORIDE 105 05/19/2022    CO2 27 05/19/2022    BUN 11 05/19/2022    CR 0.84 05/19/2022     Lab Results   Component Value Date    BILITOTAL 0.4 05/19/2022     05/19/2022    AST 50 05/19/2022    ALKPHOS 72 05/19/2022     Lab Results   Component Value Date    ALBUMIN 3.8 05/19/2022    PROTTOTAL 7.5 05/19/2022      Lab Results   Component Value Date    WBC 5.4 05/19/2022    HGB 13.8 05/19/2022    MCV 89 05/19/2022     05/19/2022     Lab Results   Component Value Date     On chart review, I found no HCV/HBV serologies.    Prior iron studies did not show overload.    Abd US 2018:  IMPRESSION:  CONCLUSION:  1.  Hepatic steatosis.  2.  Small right renal cyst.  (Spleen size was normal)      Assessment and Plan:    1.  Likely NAFLD.  This patient is noted to have a asymptomatic elevation in her transaminases that have worsened  over the last few years corresponding with weight gain.  A prior ultrasound suggested fatty liver.    We will obtain hepatitis B and C serologies today as well as a smooth muscle antibody.  We have also ordered a FibroScan to assess fibrosis.    If the above testing does not reveal any concerning findings, I do not think she needs close follow-up here.  I have scheduled a return to clinic in 1 year with a repeat liver panel.    We discussed issues related to NAFLD at some length.  There is a risk of progression to symptomatic cirrhosis or liver cancer, but these outcomes occur in a minority of patients.  At this point in time, there are no clearly effective medications for NAFLD normal but effective weight loss is clearly beneficial.  I suggested that she not increase her alcohol intake.    The patient is interested in attending the weight management clinic, and I have initiated a consult.      Follow Up:  1 year (sooner if indicated by labs)    About 35 minutes spent today with patient, reviewing results, and coordinating care.        Andrea Horta MD  Professor of Medicine  [facility]  Division of Gastroenterology, Hepatology, and Nutrition

## 2022-05-19 NOTE — TELEPHONE ENCOUNTER
Darlene had Hepatology visit on 5/19/22 with Dr. Horta who orders:    Labs today (in process)  Weight Management referral placed  Fibrosis scan  1 year return visit with Dr. Horta      Will follow-up as needed.

## 2022-05-19 NOTE — LETTER
5/19/2022         RE: Darlene Rhodes  [address]  [place] [address]        Dear Colleague,    Thank you for referring your patient, Darlene Rhodes, to the [facility] [place]. Please see a copy of my visit note below.    [facility]       Hepatology Clinic    Date of Service: 5/19/2022     Referring Provider: Dr. Haney      History of Present Illness    Ms. Rhodes is sent in consultation by Dr. Haney because of abnormal transaminases.  The patient recalls no prior    The patient reports no prior knowledge of liver problems.  She has never had hepatitis.  There is no family history of liver disease.  She has about 2 alcoholic beverages per week.      She has noted significant weight gain over the last few years.    She has a history of GERD and has seen Dr. Gan for this (see his note).  Her symptoms are well controlled on a PPI.  The remainder of her GI, hepatic, and constitutional ROS are negative.      Past Medical History:  Past Medical History:   Diagnosis Date     Cervical high risk HPV (human papillomavirus) test positive 04/12/2022 04/12/22     Gastroesophageal reflux disease with esophagitis        Patient Active Problem List   Diagnosis     CYP2D6 poor metabolizer (H)     Ulcer of esophagus without bleeding     HH (hiatus hernia)     Gastroesophageal reflux disease without esophagitis     Multiple gastric polyps     Cervical high risk HPV (human papillomavirus) test positive       Surgical History:  Past Surgical History:   Procedure Laterality Date     FOOT SURGERY Right     bone spur removal     [place] RX ECTOP PREG BY SCOPE,RMV TUBE/OVRY      Description: Laparoscopic Excision Of Ectopic Pregnancy And Salpingectomy;  Recorded: 08/31/2010;       Social History:  Social History     Tobacco Use     Smoking status: Never Smoker     Smokeless tobacco: Never Used   Substance Use Topics     Alcohol use: Yes     Comment:  Alcoholic Drinks/day: 1-2 / week       Family History:  Family History   Problem Relation Age of Onset     No Known Problems Mother      No Known Problems Father      No Known Problems Sister      No Known Problems Sister      No Known Problems Sister      No Known Problems Maternal Grandmother      No Known Problems Maternal Grandfather      Anuerysm Paternal Grandmother         brain     Prostate Cancer Paternal Grandfather      Heart Defect Daughter         congenital septal defects     Lymphoma Daughter 25     No Known Problems Daughter        Medications:  Current Outpatient Medications   Medication     LANsoprazole (PREVACID) 30 MG DR capsule     lidocaine, viscous, (XYLOCAINE) 2 % solution     loratadine-pseudoePHEDrine (CLARITIN-D 24-HOUR)  MG 24 hr tablet     No current facility-administered medications for this visit.       Review of Systems    A complete 10 point review of systems was asked and answered in the negative unless specifically commented upon in the HPI    Objective:         Vitals:    05/19/22 1231   Pulse: 87   SpO2: 97%   Weight: 97.8 kg (215 lb 11.2 oz)     Body mass index is 33.78 kg/m .     Exam  NAD, appears healthy.  Skin - no spider angiomata.  Abd - soft, NT, no HSM  Neuro - normal mentation.    Labs and Diagnostic tests:  Lab Results   Component Value Date     05/19/2022    POTASSIUM 4.0 05/19/2022    CHLORIDE 105 05/19/2022    CO2 27 05/19/2022    BUN 11 05/19/2022    CR 0.84 05/19/2022     Lab Results   Component Value Date    BILITOTAL 0.4 05/19/2022     05/19/2022    AST 50 05/19/2022    ALKPHOS 72 05/19/2022     Lab Results   Component Value Date    ALBUMIN 3.8 05/19/2022    PROTTOTAL 7.5 05/19/2022      Lab Results   Component Value Date    WBC 5.4 05/19/2022    HGB 13.8 05/19/2022    MCV 89 05/19/2022     05/19/2022     Lab Results   Component Value Date     On chart review, I found no HCV/HBV serologies.    Prior iron studies did not show  overload.    Abd US 2018:  IMPRESSION:  CONCLUSION:  1.  Hepatic steatosis.  2.  Small right renal cyst.  (Spleen size was normal)      Assessment and Plan:    1.  Likely NAFLD.  This patient is noted to have a asymptomatic elevation in her transaminases that have worsened over the last few years corresponding with weight gain.  A prior ultrasound suggested fatty liver.    We will obtain hepatitis B and C serologies today as well as a smooth muscle antibody.  We have also ordered a FibroScan to assess fibrosis.    If the above testing does not reveal any concerning findings, I do not think she needs close follow-up here.  I have scheduled a return to clinic in 1 year with a repeat liver panel.    We discussed issues related to NAFLD at some length.  There is a risk of progression to symptomatic cirrhosis or liver cancer, but these outcomes occur in a minority of patients.  At this point in time, there are no clearly effective medications for NAFLD normal but effective weight loss is clearly beneficial.  I suggested that she not increase her alcohol intake.    The patient is interested in attending the weight management clinic, and I have initiated a consult.      Follow Up:  1 year (sooner if indicated by labs)    About 35 minutes spent today with patient, reviewing results, and coordinating care.        Andrea Horta MD  Professor of Medicine  [facility]  Division of Gastroenterology, Hepatology, and Nutrition

## 2022-05-19 NOTE — PATIENT INSTRUCTIONS
It was a pleasure taking care of you today. I've included a brief summary of our discussion and care plan from today's visit below.  Please review this information with your primary care provider.  _______________________________________________________________________    My recommendations are summarized as follows:    My suspicion is that you have fatty liver disease. We will obtain blood tests today to evaluate other possibilities.  Effective weight loss is the best treatment for fatty liver disease.  We have ordered a Fibroscan to evaluate for hepatic fibrosis.  We have initiated a consult to the Weight Management Program.  We will schedule a Hepatology Clinic follow-up visit in one year. If any of the tests show concerning findings (which is unlikely), we can arrange follow-up sooner than that.        If you need any follow-up appointments, please use the following phone numbers below.    To schedule or reschedule a follow-up GI appointment, call [phone] option 1    To schedule your endoscopy procedure, call [phone] option 2    To schedule imaging, please call [phone]     To schedule your lab appointment at [address] floor lab call [phone]. Call your [facility] lab directly if it is not [facility]. If you use a non-[facility] lab, please let us know where to fax your lab order (call Karen at [phone]).      _______________________________________________________________________    Please be in touch if there are any further questions that arise following today's visit.  There are multiple ways to contact your gastroenterology care team.      During business hours, you may reach your gastroenterology RN Care Coordinator, Karen James, at [phone].      You can always send a secure message through [employer]. [employer] messages are answered by your nurse or doctor typically within 24 hours. Please allow extra time on weekends and holidays.     What is [employer]?  [employer]  is a secure way for you to access all of your healthcare records from the [facility].  It is a web based computer program, so you can sign on to it from any location.  It also allows you to send secure messages to your care team.  I recommend signing up for SpiderSuite access if you have not already done so and are comfortable with using a computer.     For urgent/emergent questions after business hours, you may reach the on-call GI Fellow by contacting the [facility]  at [phone].     How will I get the results of any tests ordered?    You will receive all of your results.  If you have signed up for [employer], any tests ordered at your visit will be available to you after your physician reviews them.  Typically this takes 1-2 weeks.  If there are urgent results that require a change in your care plan, your physician or nurse will call you to discuss the next steps.      Thank you for choosing [facility]!       Sincerely,    Andrea Horta MD  Professor of Medicine  [facility]  Division of Gastroenterology, Hepatology, and Nutrition

## 2022-05-20 LAB — HCV AB SERPL QL IA: NONREACTIVE

## 2022-05-21 LAB — SMA IGG SER-ACNC: 6 UNITS

## 2022-05-24 NOTE — TELEPHONE ENCOUNTER
Labs completed, Weight Management appointment is scheduled for 6/1.     1 year new hepatology consult has been scheduled with Anne Marie, no fibrosis scan scheduled yet. Will review with Dr. Horta.

## 2022-05-24 NOTE — TELEPHONE ENCOUNTER
Dr. Horta says via phone that Darlene is okay to continue follow-up with Anne Marie Bhatia PA-C. She still needs fibrosis scan.

## 2022-05-31 ENCOUNTER — OFFICE VISIT (OUTPATIENT)
Dept: DERMATOLOGY | Facility: CLINIC | Age: 57
End: 2022-05-31
Attending: INTERNAL MEDICINE
Payer: COMMERCIAL

## 2022-05-31 VITALS — SYSTOLIC BLOOD PRESSURE: 128 MMHG | OXYGEN SATURATION: 96 % | DIASTOLIC BLOOD PRESSURE: 83 MMHG | HEART RATE: 91 BPM

## 2022-05-31 DIAGNOSIS — D18.01 ANGIOMA OF SKIN: ICD-10-CM

## 2022-05-31 DIAGNOSIS — L81.4 LENTIGO: ICD-10-CM

## 2022-05-31 DIAGNOSIS — L82.1 SEBORRHEIC KERATOSIS: ICD-10-CM

## 2022-05-31 DIAGNOSIS — D22.9 NEVUS: ICD-10-CM

## 2022-05-31 DIAGNOSIS — J30.1 NON-SEASONAL ALLERGIC RHINITIS DUE TO POLLEN: Primary | ICD-10-CM

## 2022-05-31 DIAGNOSIS — L57.0 ACTINIC KERATOSIS: ICD-10-CM

## 2022-05-31 PROCEDURE — 99203 OFFICE O/P NEW LOW 30 MIN: CPT | Mod: 25 | Performed by: PHYSICIAN ASSISTANT

## 2022-05-31 PROCEDURE — 17000 DESTRUCT PREMALG LESION: CPT | Performed by: PHYSICIAN ASSISTANT

## 2022-05-31 NOTE — LETTER
5/31/2022         RE: Darlene Rhodes  [address]  [place] [address]        Dear Colleague,    Thank you for referring your patient, Darlene Rhodes, to the [facility]. Please see a copy of my visit note below.    HPI:   Chief complaints: Darlene Rhodes is a pleasant 56 year old female who presents for Full skin cancer screening to rule out skin cancer   Last Skin Exam: n/a      1st Baseline: yes  Personal HX of Skin Cancer: no   Personal HX of Malignant Melanoma: no   Family HX of Skin Cancer / Malignant Melanoma: no  Personal HX of Atypical Moles:   no  Risk factors: history of sun exposure  New / Changing lesions: yes scaly spot on the left calf that is newer  Social History:   On review of systems, there are no further skin complaints, patient is feeling otherwise well.   ROS of the following were done and are negative: Constitutional, Eyes, Ears, Nose,   Mouth, Throat, Cardiovascular, Respiratory, GI, Genitourinary, Musculoskeletal,   Psychiatric, Endocrine, Allergic/Immunologic.    PHYSICAL EXAM:   /83   Pulse 91   SpO2 96%   Skin exam performed as follows: Type 2 skin. Mood appropriate  Alert and Oriented X 3. Well developed, well nourished in no distress.  General appearance: Normal  Head including face: Normal  Eyes: conjunctiva and lids: Normal  Mouth: Lips, teeth, gums: Normal  Neck: Normal  Chest-breast/axillae: Normal  Back: Normal  Spleen and liver: Normal  Cardiovascular: Exam of peripheral vascular system by observation for swelling, varicosities, edema: Normal  Genitalia: groin, buttocks: Normal  Extremities: digits/nails (clubbing): Normal  Eccrine and Apocrine glands: Normal  Right upper extremity: Normal  Left upper extremity: Normal  Right lower extremity: Normal  Left lower extremity: Normal  Skin: Scalp and body hair: See below    Pt deferred exam of breasts, groin, buttocks: No    Other physical findings:  1. Multiple pigmented macules  on extremities and trunk  2. Multiple pigmented macules on face, trunk and extremities  3. Multiple vascular papules on trunk, arms and legs  4. Multiple scattered keratotic plaques  5. Pink gritty papule on the right nasal side wall x 1       Except as noted above, no other signs of skin cancer or melanoma.     ASSESSMENT/PLAN:   Benign Full skin cancer screening today. . Patient with history of none  Advised on monthly self exams and 1 year  Patient Education: Appropriate brochures given.    1. Multiple benign appearing melanocytic nevi on arms, legs and trunk. Discussed ABCDEs of melanoma and sunscreen.   2. Multiple lentigos on arms, legs and trunk. Advised benign, no treatment needed.  3. Multiple scattered angiomas. Advised benign, no treatment needed.   4. Seborrheic keratosis on arms, legs and trunk. Advised benign, no treatment needed.  5. Actinic keratosis on the right nasal side wall x 1. As precancerous, cryosurgery performed. Advised on blistering and post-op care. Advised if not resolved in 1-2 months to return for evaluation  6. Requests a prescription for claritin with pseudoephedrine for seasonal allergies. Advised to take daily PRN            Follow-up: yearly/PRN sooner    1.) Patient was asked about new and changing moles. YES  2.) Patient received a complete physical skin examination: YES  3.) Patient was counseled to perform a monthly self skin examination: YES  Scribed By: Gema Ambrocio, MS, PAJACK          Again, thank you for allowing me to participate in the care of your patient.        Sincerely,        Gema Ambrocio PA-C

## 2022-05-31 NOTE — PROGRESS NOTES
"  New Weight Management Nutrition Consultation    Darlene Rhodes is a 56 year old female presents today for new weight management nutrition consultation.  Patient referred by MAT Willis on June 1st, 2022.    Patient with Co-morbidities of obesity including:  Type II DM no  Renal Failure no  Sleep apnea no  Hypertension no   Dyslipidemia yes  Joint pain no  Back pain no  GERD yes    PMH: fatty liver    Anthropometrics:  Weight 6/1/22: 216 lbs with BMI 33.91    Estimated body mass index is 33.91 kg/m  as calculated from the following:    Height as of an earlier encounter on 6/1/22: 1.702 m (5' 7\").    Weight as of an earlier encounter on 6/1/22: 98.2 kg (216 lb 8 oz).    Medications for Weight Loss:  Checking on coverage for Saxenda or Wegovy    Supplements:  Did not discuss today    Has gone through Menopause    Labs being done today    NUTRITION HISTORY    Pt reports never working with a RD before. She is curious to learn more about dietary recommendations. Pt reports a few people in her family have only lived to 70 - she is hoping for nutrition to help improve her lifespan/overall health.     Hx has always been super active. Was training for the [employer] marathon in 2019 when she injured herself. Has done PT. Recently bought an elliptical. Wants to be more active again but worried about injuring herself.     More frozen meals/eating out in recent years due to children moving out. Her youngest (26) recently moved back in. Pt not wanting to cook every day - low energy.     Generally eats slow and feels she has good portion control. If going out to eat meal will last her 2-3 meals.    Typical day  B - varies: yogurt with granola OR waffle OR english muffin with honey OR bagel with cream cheese OR eggs  L - leftovers   D - varies     Additional information:  Retired      Has 3 children - 26 year old (youngest) currently lives with them    Nutrition Prescription  Recommended energy/nutrient " modification.    Nutrition Diagnosis  Obesity r/t long history of positive energy balance aeb BMI >30.    Nutrition Intervention  Reviewed current dietary habits and pts history   Discussed long-term goals pt hopes to accomplish in RD appointments  Reviewed and modified previous goals  Answered pt questions  Coordination of care   Nutrition education - discussed Plate Method, BMI, estimated energy needs, general sodium recommendations, etc.  AVS and handouts via [employer]    Patient demonstrates understanding.    Expected Engagement: good    Nutrition Goals  1) Aim to make 2-3 meals/week  2) Recommend plate method (1/2 plate non-starchy vegetables, 1/4 plate lean protein, 1/4 plate carbohydrates - whole grains, fruit, starchy vegetables)    General sodium recommendations: 2300 mg (2.3 g) per day    BMI weight class discussed is equal to 118-159 lbs.     Estimated energy needs at rest of ~1930 kcal/day. For 1 lb weight loss/week needs would be ~1430 kcal/day (at complete rest).     Frozen meal examples that are typically more nutritious  - Healthy Choice  - Lean Cuisine  - Atkins Meals  - Smart Ones    Faster meal components  - Deli meat  - Rotisserie chicken  - Eggs  - Grain cups/bags (such as minute made rice/quiona cups, good & gather grain blends, etc)  - Fresh fruits/vegetables  - Yogurt  - Cottage cheese  - Hummus  - Salad kits  - Canned beans    RESOURCES    Eliseo Ludwig article showing the USDA MyPlate: http://fvhsib.SwitchForce.Catapult/3,90861     Protein Sources   [email or web address]     Carbohydrates  [email or web address]     Mindful Eating  [email or web address]     Summary of Volumetrics Eating Plan  [email or web address]     Follow-Up:  1 month    Time spent with patient:  35 minutes.  SAM Lal, RD, LD

## 2022-05-31 NOTE — PATIENT INSTRUCTIONS
WOUND CARE INSTRUCTIONS   FOR CRYOSURGERY   This area treated with liquid nitrogen should form a blister (areas treated may or may not blister-skin may just turn dark and slough off). You do not need to bandage the area unless a blister forms and breaks (which may be a few days). When the blister breaks, begin daily dressing changes as follows:  1) Clean and dry the area with tap water using clean Q-tip or sterile gauze pad.   2) Apply Polysporin ointment or Bacitracin ointment over entire wound. Do NOT use Neosporin ointment.   3) Cover the wound with a band-aid or sterile non-stick gauze pad and micropore paper tape.   REPEAT THESE INSTRUCTIONS AT LEAST ONCE A DAY UNTIL THE WOUND HAS COMPLETELY HEALED.   It is an old wives tale that a wound heals better when it is exposed to air and allowed to dry out. The wound will heal faster with a better cosmetic result if it is kept moist with ointment and covered with a bandage.   Do not let the wound dry out.   IMPORTANT INFORMATION ON REVERSE SIDE   Supplies Needed:   *Cotton tipped applicators (Q-tips)   *Polysporin ointment or Bacitracin ointment (NOT NEOSPORIN)   *Band-aids, or non stick gauze pads and micropore paper tape   PATIENT INFORMATION   During the healing process you will notice a number of changes. All wounds develop a small halo of redness surrounding the wound. This means healing is occurring. Severe itching with extensive redness usually indicates sensitivity to the ointment or bandage tape used to dress the wound. You should call our office if this develops.   Swelling and/or discoloration around your surgical site is common, particularly when performed around the eye.   All wounds normally drain. The larger the wound the more drainage there will be. After 7-10 days, you will notice the wound beginning to shrink and new skin will begin to grow. The wound is healed when you can see skin has formed over the entire area. A healed wound has a healthy, shiny  look to the surface and is red to dark pink in color to normalize. Wounds may take approximately 4-6 weeks to heal. Larger wounds may take 6-8 weeks. After the wound is healed you may discontinue dressing changes.   You may experience a sensation of tightness as your wound heals. This is normal and will gradually subside.   Your healed wound may be sensitive to temperature changes. This sensitivity improves with time, but if you re having a lot of discomfort, try to avoid temperature extremes.   Patients frequently experience itching after their wound appears to have healed because of the continue healing under the skin. Plain Vaseline will help relieve the itching.       Proper skin care from  [facility]     Eliminate harsh soaps, i.e. Dial, Zest, [place];   Use mild soaps such as Cetaphil or Dove Sensitive Skin   Avoid hot or cold showers   After showering, lightly dry off.    Aggressive use of a moisturizer (including Vanicream, Cetaphil, Aquaphor or Cerave)   We recommend using a tub that needs to be scooped out, not a pump. This has more of an oil base. It will hold moisture in your skin much better than a water base moisturizer. The ones recommended are non- pore clogging.       If you have any questions call [phone].

## 2022-05-31 NOTE — PROGRESS NOTES
"30 minutes spent on the date of the encounter doing chart review, history and exam, documentation and further activities per the note    New Medical Weight Management Consult    PATIENT:  Darlene Rhodes  MRN:         [id number]  :         1965  HONG:         2022    Dear Dr Horta,    I had the pleasure of seeing your patient, Darlene Rhodes. Full intake/assessment was done to determine barriers to weight loss success and develop a treatment plan. Darlene Rhodes is a 56 year old female interested in treatment of medical problems associated with excess weight. She has a height of 5' 7\", a weight of 216 lbs 8 oz, and the calculated Body mass index is 33.91 kg/m .    Referred by Dr Mike for NAFLD. Fibroscan S2, Fibrosis 0-1    Struggled with weight more with menopause.   Hasn't cooked as much since empty [place], eating out more    Assessment & Plan   Problem List Items Addressed This Visit        Endocrine Diagnoses    Class 1 obesity with serious comorbidity and body mass index (BMI) of 33.0 to 33.9 in adult       Other    NAFLD (nonalcoholic fatty liver disease)         Consider GLP1, check insurance for coverage for Saxenda or Wegovy. Check list CYP2D6  Labs today first floor  See RD today    She has the following co-morbidities:       2022   I have the following health issues associated with obesity: High Cholesterol, GERD (Reflux), Fatty Liver   I have the following symptoms associated with obesity: Fatigue       Patient Goals 2022   I am interested in having a healthier weight to diminish current health problems: Yes   I am interested in having a healthier weight in order to prevent future health problems: Yes   I am interested in having a healthier weight in order to have a future surgery: No       Referring Provider 2022   Please name the provider who referred you to Medical Weight Management.  If you do not know, please answer: \"I Don't Know\". Dr. Horta       Weight " "History 5/31/2022   How concerned are you about your weight? Very Concerned   Would you describe your weight gain as gradual? Yes   I became overweight: As an Adult   The following factors have contributed to my weight gain:  Other   Please list the other factors.  menopause   I have tried the following methods to lose weight: Watching Portions or Calories, Exercise   My lowest weight since age 19 was: 145   My highest weight since age 19 was: 215   The most weight I have ever lost was: (lbs) 25   I have the following family history of obesity/being overweight:  My mother is overweight, One or more of my siblings are overweight, Many of my relatives are overweight   Has anyone in your family had weight loss surgery? No   How has your weight changed over the last year?  Gained   How many pounds? 10       Diet Recall Review with Patient 5/31/2022   Do you typically eat breakfast? Yes   If you do eat breakfast, what types of food do you eat? yogurt w/granola, fruit, waffle w/real maple syrup, English muffin w/honey, bagel w/cream cheese, eggs, less often I have toast, sausage   Do you typically eat lunch? Yes   If you do eat lunch, what types of food do you typically eat?  leftovers, just about anything, there really is no \"typical\" lunch   Do you typically eat supper? Yes   If you do eat supper, what types of food do you typically eat? depends what we're in the mood for, don't really have a \"typical\" dinner   Do you typically eat snacks? Yes   If you do snack, what types of food do you typically eat? Not often, but if so it runs the gamut from pretzels/veggies w/hummus, cheese/crackers, fruit, chips/guacamole   Do you like vegetables?  Yes   Do you drink water? Yes   How many glasses of juice do you drink in a typical day? 0   How many of glasses of milk do you drink in a typical day? 0   If you do drink milk, what type? N/A   How many 8oz glasses of sugar containing drinks such as Steven-Aid/sweet tea do you drink in a " day? 0   How many cans/bottles of sugar pop/soda/tea/sports drinks do you drink in a day? 0   How many cans/bottles of diet pop/soda/tea or sports drink do you drink in a day? 0   How often do you have a drink of alcohol? 2-4 Times a Month   If you do drink, how many drinks might you have in a day? 1 or 2       Eating Habits 5/31/2022   Generally, my meals include foods like these: bread, pasta, rice, potatoes, corn, crackers, sweet dessert, pop, or juice. Everyday   Generally, my meals include foods like these: fried meats, brats, burgers, french fries, pizza, cheese, chips, or ice cream. A Few Times a Week   Eat fast food (like McDonalds, [employer], Taco Bell). Less Than Weekly   Eat at a buffet or sit-down restaurant. Less Than Weekly   Eat most of my meals in front of the TV or computer. Less Than Weekly   Often skip meals, eat at random times, have no regular eating times. A Few Times a Week   Rarely sit down for a meal but snack or graze throughout.  Once a Week   Eat extra snacks between meals. A Few Times a Week   Eat most of my food at the end of the day. A Few Times a Week   Eat in the middle of the night or wake up at night to eat. Never   Eat extra snacks to prevent or correct low blood sugar. Less Than Weekly   Eat to prevent acid reflux or stomach pain. Never   Worry about not having enough food to eat. Never   Have you been to the food shelf at least a few times this year? No   I eat when I am depressed. Less Than Weekly   I eat when I am stressed. Less Than Weekly   I eat when I am bored. Never   I eat when I am anxious. Less Than Weekly   I eat when I am happy or as a reward. Less Than Weekly   I feel hungry all the time even if I just have eaten. Less Than Weekly   Feeling full is important to me. Less Than Weekly   I finish all the food on my plate even if I am already full. Less Than Weekly   I can't resist eating delicious food or walk past the good food/smell. Once a Week   I eat/snack  without noticing that I am eating. Less Than Weekly   I eat when I am preparing the meal. Less Than Weekly   I eat more than usual when I see others eating. Less Than Weekly   I have trouble not eating sweets, ice cream, cookies, or chips if they are around the house. Once a Week   I think about food all day. Never   What foods, if any, do you crave? None       Amount of Food 5/31/2022   I make myself vomit what I have eaten or use laxatives to get rid of food. Never   I eat a large amount of food, like a loaf of bread, a box of cookies, a pint/quart of ice cream, all at once. Never   I eat a large amount of food even when I am not hungry. Never   I eat rapidly. Never   I eat alone because I feel embarrassed and do not want others to see how much I have eaten. Never   I eat until I am uncomfortably full. Monthly   I feel bad, disgusted, or guilty after I overeat. Never   I make myself vomit what I have eaten or use laxatives to get rid of food. Never       Activity/Exercise History 5/31/2022   How much of a typical 12 hour day do you spend sitting? Half the Day   How much of a typical 12 hour day do you spend lying down? Less Than Half the Day   How much of a typical day do you spend walking/standing? Half the Day   How many hours (not including work) do you spend on the TV/Video Games/Computer/Tablet/Phone? 1 Hour or Less   How many times a week are you active for the purpose of exercise? 2-3 Times a Week   What keeps you from being more active? Too tired   How many total minutes do you spend doing some activity for the purpose of exercising when you exercise? More Than 30 Minutes       PAST MEDICAL HISTORY:  Past Medical History:   Diagnosis Date     Cervical high risk HPV (human papillomavirus) test positive 04/12/2022 04/12/22     Gastroesophageal reflux disease with esophagitis        Work/Social History Reviewed With Patient 5/31/2022   My employment status is: Retired   What is your marital status?  /In a Relationship   If in a relationship, is your significant other overweight? Yes   Do you have children? Yes   If you have children, are they overweight? No   Who do you live with?   and adult daughter   Are they supportive of your health goals? Yes   Who does the food shopping?  We all do       Mental Health History Reviewed With Patient 5/31/2022   Have you ever been physically or sexually abused? No   If yes, do you feel that the abuse is affecting your weight? N/A   If yes, would you like to talk to a counselor about the abuse? N/A   How often in the past 2 weeks have you felt little interest or pleasure in doing things? Not at all   Over the past 2 weeks how often have you felt down, depressed, or hopeless? Not at all       Sleep History Reviewed With Patient 5/31/2022   How many hours do you sleep at night? 8   Do you think that you snore loudly or has anybody ever heard you snore loudly (louder than talking or so loud it can be heard behind a shut door)? No   Has anyone seen or heard you stop breathing during your sleep? No   Do you often feel tired, fatigued, or sleepy during the day? No   Do you have a TV/Computer in your bedroom? No       MEDICATIONS:   Current Outpatient Medications   Medication Sig Dispense Refill     LANsoprazole (PREVACID) 30 MG DR capsule [LANSOPRAZOLE (PREVACID) 30 MG CAPSULE] TAKE 1 CAPSULE (30 MG TOTAL) BY MOUTH DAILY. 90 capsule 3     lidocaine, viscous, (XYLOCAINE) 2 % solution Swish and swallow 30 mLs in mouth every 3 hours as needed for moderate pain or pain (Heartburn) ; Max 8 doses/24 hour period. 100 mL 3     loratadine-pseudoePHEDrine (CLARITIN-D 24-HOUR)  MG 24 hr tablet 1 tab PO daily 30 tablet 3     Vitamin D, Cholecalciferol, 25 MCG (1000 UT) CAPS        loratadine-pseudoePHEDrine (CLARITIN-D 24-HOUR)  MG 24 hr tablet Take 1 tablet by mouth daily         ALLERGIES:   Allergies   Allergen Reactions     Animal Dander Unknown     Grass Unknown      Hydrocodone-Acetaminophen Unknown     Annotation: patient does not respond to this medication       Hydromorphone Unknown     Annotation: patient gets dizzy, headache, and does not respond to this       Hydroxyzine Unknown     Annotation: does not work    Annotation: does not work       Iodinated Contrast Media [Diagnostic X-Ray Materials] Itching     Other       Morphine Unknown     Annotation: patient does not respond to this medication       Ondansetron Unknown     Annotation: does not work       Oxycodone-Acetaminophen Unknown     Annotation: patient does not respond to this medication         Lab on 05/19/2022   Component Date Value Ref Range Status     F-Actin (Smooth Muscle) Ab, IgG by* 05/19/2022 6  0 - 19 Units Final    Comment:    If F-Actin (Smooth Muscle) Antibody, IgG is negative, the   Smooth Muscle Antibody titer by IFA is not performed.  REFERENCE INTERVAL: F-Actin (Smooth Muscle) Antibody, IgG   by                       ROXANN      19 Units or less ....... Negative    20 - 30 Units .......... Weak Positive-Suggest repeat                             testing in two to three weeks                             with fresh specimen.    31 Units or greater..... Positive-Suggestive of                             autoimmune hepatitis type 1                             or chronic active hepatitis.    F-actin IgG antibodies have been shown to have increased   sensitivity for autoimmune hepatitis (AIH) but lower   specificity than smooth muscle antibodies (SMA). F-actin   IgG antibodies can also be seen in SMA-negative disease   controls (non-AIH), especially in patients with primary   biliary cirrhosis and chronic hepatitis C infections. Some   patients with AIH may be SMA-positive but negative for   F-actin IgG.                            Consider testing for SMA by IFA if suspicion   for AIH is strong.  Performed By: [facility]  [address] [place] [id number]  : Odalis  "MELVA Hollingsworth MD     Hepatitis C Antibody 05/19/2022 Nonreactive  Nonreactive Final       PHYSICAL EXAM:  Objective    BP (!) 144/83 (BP Location: Left arm, Patient Position: Sitting, Cuff Size: Adult Large)   Pulse 84   Ht 1.702 m (5' 7\")   Wt 98.2 kg (216 lb 8 oz)   SpO2 97%   BMI 33.91 kg/m    Body mass index is 33.91 kg/m .  Physical Exam   GENERAL: Healthy, alert and no distress  EYES: Eyes grossly normal to inspection.  No discharge or erythema, or obvious scleral/conjunctival abnormalities.  RESP: No audible wheeze, cough, or visible cyanosis.  No visible retractions or increased work of breathing.    SKIN: Visible skin clear. No significant rash, abnormal pigmentation or lesions.  NEURO: Cranial nerves grossly intact.  Mentation and speech appropriate for age.  PSYCH: Mentation appears normal, affect normal/bright, judgement and insight intact, normal speech and appearance well-groomed.          Sincerely,    Bailey Evans PA-C          [facility]    Referred by: Dr Horta    Patient name: Darlene Rhodes    56 year old female    BMI: 33    Type 2 Diabetes?: No. Fasting glucose 112.  No A1C in Epic.    Hepatology/Fibroscan? Saw Dr Horta and fibroscan May 2022    The median liver stiffness was 5.2 kPa with IQR/Median percentage of 5 %.     The measure CAP ultrasound attenuation rate value was 330 dB/m.     IMPRESSION:       1. Estimated liver fibrosis is Stage 0-1   2. Steatosis Grade S2        Recent labs:   Lab Results   Component Value Date      05/19/2022     POTASSIUM 4.0 05/19/2022     CHLORIDE 105 05/19/2022     CO2 27 05/19/2022     BUN 11 05/19/2022     CR 0.84 05/19/2022            Lab Results   Component Value Date     BILITOTAL 0.4 05/19/2022      05/19/2022     AST 50 05/19/2022     ALKPHOS 72 05/19/2022            Lab Results   Component Value Date     ALBUMIN 3.8 05/19/2022     PROTTOTAL 7.5 05/19/2022            Lab Results   Component Value Date     WBC 5.4 " 05/19/2022     HGB 13.8 05/19/2022     MCV 89 05/19/2022      05/19/2022          Liver imaging:     Abd US 2018:  IMPRESSION:  CONCLUSION:  1.  Hepatic steatosis.  2.  Small right renal cyst.  (Spleen size was normal)         FIB-4 Score:     Interested in sleeve gastrectomy?: No BMI 33    Interested in research?: No    ASSESSMENT/PLAN:  1. Bailey RIOJAS   2. Elayne CARRION video 145 or possibly add on to in person visit after Bailey or before.

## 2022-05-31 NOTE — RESULT ENCOUNTER NOTE
Ms. Rhodes:    Your recent Fiboscan did not show evidence of advanced fibrosis of the liver, which is good news. It did show steatosis (fatty liver).    As we discussed in clinic, I am suggesting that you be evaluated in the Weight Management clinic to work on strategies for weight loss. We also recommend a follow-up visit in Hepatology (Liver) Clinic in one year.    - Dr. Horta

## 2022-05-31 NOTE — PROGRESS NOTES
HPI:   Chief complaints: Darlene Rhodes is a pleasant 56 year old female who presents for Full skin cancer screening to rule out skin cancer   Last Skin Exam: n/a      1st Baseline: yes  Personal HX of Skin Cancer: no   Personal HX of Malignant Melanoma: no   Family HX of Skin Cancer / Malignant Melanoma: no  Personal HX of Atypical Moles:   no  Risk factors: history of sun exposure  New / Changing lesions: yes scaly spot on the left calf that is newer  Social History:   On review of systems, there are no further skin complaints, patient is feeling otherwise well.   ROS of the following were done and are negative: Constitutional, Eyes, Ears, Nose,   Mouth, Throat, Cardiovascular, Respiratory, GI, Genitourinary, Musculoskeletal,   Psychiatric, Endocrine, Allergic/Immunologic.    PHYSICAL EXAM:   /83   Pulse 91   SpO2 96%   Skin exam performed as follows: Type 2 skin. Mood appropriate  Alert and Oriented X 3. Well developed, well nourished in no distress.  General appearance: Normal  Head including face: Normal  Eyes: conjunctiva and lids: Normal  Mouth: Lips, teeth, gums: Normal  Neck: Normal  Chest-breast/axillae: Normal  Back: Normal  Spleen and liver: Normal  Cardiovascular: Exam of peripheral vascular system by observation for swelling, varicosities, edema: Normal  Genitalia: groin, buttocks: Normal  Extremities: digits/nails (clubbing): Normal  Eccrine and Apocrine glands: Normal  Right upper extremity: Normal  Left upper extremity: Normal  Right lower extremity: Normal  Left lower extremity: Normal  Skin: Scalp and body hair: See below    Pt deferred exam of breasts, groin, buttocks: No    Other physical findings:  1. Multiple pigmented macules on extremities and trunk  2. Multiple pigmented macules on face, trunk and extremities  3. Multiple vascular papules on trunk, arms and legs  4. Multiple scattered keratotic plaques  5. Pink gritty papule on the right nasal side wall x 1       Except as noted  above, no other signs of skin cancer or melanoma.     ASSESSMENT/PLAN:   Benign Full skin cancer screening today. . Patient with history of none  Advised on monthly self exams and 1 year  Patient Education: Appropriate brochures given.    1. Multiple benign appearing melanocytic nevi on arms, legs and trunk. Discussed ABCDEs of melanoma and sunscreen.   2. Multiple lentigos on arms, legs and trunk. Advised benign, no treatment needed.  3. Multiple scattered angiomas. Advised benign, no treatment needed.   4. Seborrheic keratosis on arms, legs and trunk. Advised benign, no treatment needed.  5. Actinic keratosis on the right nasal side wall x 1. As precancerous, cryosurgery performed. Advised on blistering and post-op care. Advised if not resolved in 1-2 months to return for evaluation  6. Requests a prescription for claritin with pseudoephedrine for seasonal allergies. Advised to take daily PRN            Follow-up: yearly/PRN sooner    1.) Patient was asked about new and changing moles. YES  2.) Patient received a complete physical skin examination: YES  3.) Patient was counseled to perform a monthly self skin examination: YES  Scribed By: Gema Ambrocio, MS, PA-C

## 2022-06-01 ENCOUNTER — OFFICE VISIT (OUTPATIENT)
Dept: ENDOCRINOLOGY | Facility: CLINIC | Age: 57
End: 2022-06-01
Payer: COMMERCIAL

## 2022-06-01 ENCOUNTER — ALLIED HEALTH/NURSE VISIT (OUTPATIENT)
Dept: ENDOCRINOLOGY | Facility: CLINIC | Age: 57
End: 2022-06-01
Payer: COMMERCIAL

## 2022-06-01 ENCOUNTER — LAB (OUTPATIENT)
Dept: LAB | Facility: CLINIC | Age: 57
End: 2022-06-01

## 2022-06-01 VITALS
HEIGHT: 67 IN | HEART RATE: 84 BPM | BODY MASS INDEX: 33.98 KG/M2 | WEIGHT: 216.5 LBS | OXYGEN SATURATION: 97 % | DIASTOLIC BLOOD PRESSURE: 83 MMHG | SYSTOLIC BLOOD PRESSURE: 144 MMHG

## 2022-06-01 DIAGNOSIS — E66.811 CLASS 1 OBESITY WITH SERIOUS COMORBIDITY AND BODY MASS INDEX (BMI) OF 33.0 TO 33.9 IN ADULT, UNSPECIFIED OBESITY TYPE: ICD-10-CM

## 2022-06-01 DIAGNOSIS — R79.89 ELEVATED LIVER FUNCTION TESTS: ICD-10-CM

## 2022-06-01 DIAGNOSIS — Z71.3 NUTRITIONAL COUNSELING: Primary | ICD-10-CM

## 2022-06-01 DIAGNOSIS — K76.0 NAFLD (NONALCOHOLIC FATTY LIVER DISEASE): ICD-10-CM

## 2022-06-01 LAB
CHOLEST SERPL-MCNC: 287 MG/DL
DEPRECATED CALCIDIOL+CALCIFEROL SERPL-MC: 44 UG/L (ref 20–75)
FASTING STATUS PATIENT QL REPORTED: ABNORMAL
HBA1C MFR BLD: 5.5 % (ref 0–5.6)
HDLC SERPL-MCNC: 43 MG/DL
LDLC SERPL CALC-MCNC: 183 MG/DL
NONHDLC SERPL-MCNC: 244 MG/DL
PTH-INTACT SERPL-MCNC: 86 PG/ML (ref 15–65)
TRIGL SERPL-MCNC: 306 MG/DL

## 2022-06-01 PROCEDURE — 99203 OFFICE O/P NEW LOW 30 MIN: CPT | Mod: 24 | Performed by: PHYSICIAN ASSISTANT

## 2022-06-01 PROCEDURE — 80061 LIPID PANEL: CPT | Performed by: PATHOLOGY

## 2022-06-01 PROCEDURE — 36415 COLL VENOUS BLD VENIPUNCTURE: CPT | Performed by: PATHOLOGY

## 2022-06-01 PROCEDURE — 99000 SPECIMEN HANDLING OFFICE-LAB: CPT | Performed by: PATHOLOGY

## 2022-06-01 PROCEDURE — 83036 HEMOGLOBIN GLYCOSYLATED A1C: CPT | Performed by: PATHOLOGY

## 2022-06-01 PROCEDURE — 82306 VITAMIN D 25 HYDROXY: CPT | Mod: 90 | Performed by: PATHOLOGY

## 2022-06-01 PROCEDURE — 97802 MEDICAL NUTRITION INDIV IN: CPT | Mod: GT | Performed by: DIETITIAN, REGISTERED

## 2022-06-01 PROCEDURE — 83970 ASSAY OF PARATHORMONE: CPT | Performed by: PATHOLOGY

## 2022-06-01 RX ORDER — FAMOTIDINE 20 MG
TABLET ORAL
COMMUNITY

## 2022-06-01 ASSESSMENT — PAIN SCALES - GENERAL: PAINLEVEL: NO PAIN (0)

## 2022-06-01 NOTE — LETTER
"2022       RE: Darlene Rhodes  [address]  [place] [address]     Dear Colleague,    Thank you for referring your patient, Darlene Rhodes, to the [facility]. Please see a copy of my visit note below.    30 minutes spent on the date of the encounter doing chart review, history and exam, documentation and further activities per the note    New Medical Weight Management Consult    PATIENT:  Darlene Rhodes  MRN:         [id number]  :         1965  HONG:         2022    Dear Dr Horta,    I had the pleasure of seeing your patient, Darlene Rhodes. Full intake/assessment was done to determine barriers to weight loss success and develop a treatment plan. Darlene Rhodes is a 56 year old female interested in treatment of medical problems associated with excess weight. She has a height of 5' 7\", a weight of 216 lbs 8 oz, and the calculated Body mass index is 33.91 kg/m .    Referred by Dr Mike for NAFLD. Fibroscan S2, Fibrosis 0-1    Struggled with weight more with menopause.   Hasn't cooked as much since empty [place], eating out more    Assessment & Plan   Problem List Items Addressed This Visit        Endocrine Diagnoses    Class 1 obesity with serious comorbidity and body mass index (BMI) of 33.0 to 33.9 in adult       Other    NAFLD (nonalcoholic fatty liver disease)         Consider GLP1, check insurance for coverage for Saxenda or Wegovy. Check list CYP2D6  Labs today first floor  See RD today    She has the following co-morbidities:       2022   I have the following health issues associated with obesity: High Cholesterol, GERD (Reflux), Fatty Liver   I have the following symptoms associated with obesity: Fatigue       Patient Goals 2022   I am interested in having a healthier weight to diminish current health problems: Yes   I am interested in having a healthier weight in " "order to prevent future health problems: Yes   I am interested in having a healthier weight in order to have a future surgery: No       Referring Provider 5/31/2022   Please name the provider who referred you to Medical Weight Management.  If you do not know, please answer: \"I Don't Know\". Dr. Horta       Weight History 5/31/2022   How concerned are you about your weight? Very Concerned   Would you describe your weight gain as gradual? Yes   I became overweight: As an Adult   The following factors have contributed to my weight gain:  Other   Please list the other factors.  menopause   I have tried the following methods to lose weight: Watching Portions or Calories, Exercise   My lowest weight since age 19 was: 145   My highest weight since age 19 was: 215   The most weight I have ever lost was: (lbs) 25   I have the following family history of obesity/being overweight:  My mother is overweight, One or more of my siblings are overweight, Many of my relatives are overweight   Has anyone in your family had weight loss surgery? No   How has your weight changed over the last year?  Gained   How many pounds? 10       Diet Recall Review with Patient 5/31/2022   Do you typically eat breakfast? Yes   If you do eat breakfast, what types of food do you eat? yogurt w/granola, fruit, waffle w/real maple syrup, English muffin w/honey, bagel w/cream cheese, eggs, less often I have toast, sausage   Do you typically eat lunch? Yes   If you do eat lunch, what types of food do you typically eat?  leftovers, just about anything, there really is no \"typical\" lunch   Do you typically eat supper? Yes   If you do eat supper, what types of food do you typically eat? depends what we're in the mood for, don't really have a \"typical\" dinner   Do you typically eat snacks? Yes   If you do snack, what types of food do you typically eat? Not often, but if so it runs the gamut from pretzels/veggies w/hummus, cheese/crackers, fruit, " chips/guacamole   Do you like vegetables?  Yes   Do you drink water? Yes   How many glasses of juice do you drink in a typical day? 0   How many of glasses of milk do you drink in a typical day? 0   If you do drink milk, what type? N/A   How many 8oz glasses of sugar containing drinks such as Steven-Aid/sweet tea do you drink in a day? 0   How many cans/bottles of sugar pop/soda/tea/sports drinks do you drink in a day? 0   How many cans/bottles of diet pop/soda/tea or sports drink do you drink in a day? 0   How often do you have a drink of alcohol? 2-4 Times a Month   If you do drink, how many drinks might you have in a day? 1 or 2       Eating Habits 5/31/2022   Generally, my meals include foods like these: bread, pasta, rice, potatoes, corn, crackers, sweet dessert, pop, or juice. Everyday   Generally, my meals include foods like these: fried meats, brats, burgers, french fries, pizza, cheese, chips, or ice cream. A Few Times a Week   Eat fast food (like McDonalds, Burger Alfonso, Taco Bell). Less Than Weekly   Eat at a buffet or sit-down restaurant. Less Than Weekly   Eat most of my meals in front of the TV or computer. Less Than Weekly   Often skip meals, eat at random times, have no regular eating times. A Few Times a Week   Rarely sit down for a meal but snack or graze throughout.  Once a Week   Eat extra snacks between meals. A Few Times a Week   Eat most of my food at the end of the day. A Few Times a Week   Eat in the middle of the night or wake up at night to eat. Never   Eat extra snacks to prevent or correct low blood sugar. Less Than Weekly   Eat to prevent acid reflux or stomach pain. Never   Worry about not having enough food to eat. Never   Have you been to the food shelf at least a few times this year? No   I eat when I am depressed. Less Than Weekly   I eat when I am stressed. Less Than Weekly   I eat when I am bored. Never   I eat when I am anxious. Less Than Weekly   I eat when I am happy or as a  reward. Less Than Weekly   I feel hungry all the time even if I just have eaten. Less Than Weekly   Feeling full is important to me. Less Than Weekly   I finish all the food on my plate even if I am already full. Less Than Weekly   I can't resist eating delicious food or walk past the good food/smell. Once a Week   I eat/snack without noticing that I am eating. Less Than Weekly   I eat when I am preparing the meal. Less Than Weekly   I eat more than usual when I see others eating. Less Than Weekly   I have trouble not eating sweets, ice cream, cookies, or chips if they are around the house. Once a Week   I think about food all day. Never   What foods, if any, do you crave? None       Amount of Food 5/31/2022   I make myself vomit what I have eaten or use laxatives to get rid of food. Never   I eat a large amount of food, like a loaf of bread, a box of cookies, a pint/quart of ice cream, all at once. Never   I eat a large amount of food even when I am not hungry. Never   I eat rapidly. Never   I eat alone because I feel embarrassed and do not want others to see how much I have eaten. Never   I eat until I am uncomfortably full. Monthly   I feel bad, disgusted, or guilty after I overeat. Never   I make myself vomit what I have eaten or use laxatives to get rid of food. Never       Activity/Exercise History 5/31/2022   How much of a typical 12 hour day do you spend sitting? Half the Day   How much of a typical 12 hour day do you spend lying down? Less Than Half the Day   How much of a typical day do you spend walking/standing? Half the Day   How many hours (not including work) do you spend on the TV/Video Games/Computer/Tablet/Phone? 1 Hour or Less   How many times a week are you active for the purpose of exercise? 2-3 Times a Week   What keeps you from being more active? Too tired   How many total minutes do you spend doing some activity for the purpose of exercising when you exercise? More Than 30 Minutes       PAST  MEDICAL HISTORY:  Past Medical History:   Diagnosis Date     Cervical high risk HPV (human papillomavirus) test positive 04/12/2022 04/12/22     Gastroesophageal reflux disease with esophagitis        Work/Social History Reviewed With Patient 5/31/2022   My employment status is: Retired   What is your marital status? /In a Relationship   If in a relationship, is your significant other overweight? Yes   Do you have children? Yes   If you have children, are they overweight? No   Who do you live with?   and adult daughter   Are they supportive of your health goals? Yes   Who does the food shopping?  We all do       Mental Health History Reviewed With Patient 5/31/2022   Have you ever been physically or sexually abused? No   If yes, do you feel that the abuse is affecting your weight? N/A   If yes, would you like to talk to a counselor about the abuse? N/A   How often in the past 2 weeks have you felt little interest or pleasure in doing things? Not at all   Over the past 2 weeks how often have you felt down, depressed, or hopeless? Not at all       Sleep History Reviewed With Patient 5/31/2022   How many hours do you sleep at night? 8   Do you think that you snore loudly or has anybody ever heard you snore loudly (louder than talking or so loud it can be heard behind a shut door)? No   Has anyone seen or heard you stop breathing during your sleep? No   Do you often feel tired, fatigued, or sleepy during the day? No   Do you have a TV/Computer in your bedroom? No       MEDICATIONS:   Current Outpatient Medications   Medication Sig Dispense Refill     LANsoprazole (PREVACID) 30 MG DR capsule [LANSOPRAZOLE (PREVACID) 30 MG CAPSULE] TAKE 1 CAPSULE (30 MG TOTAL) BY MOUTH DAILY. 90 capsule 3     lidocaine, viscous, (XYLOCAINE) 2 % solution Swish and swallow 30 mLs in mouth every 3 hours as needed for moderate pain or pain (Heartburn) ; Max 8 doses/24 hour period. 100 mL 3     loratadine-pseudoePHEDrine  (CLARITIN-D 24-HOUR)  MG 24 hr tablet 1 tab PO daily 30 tablet 3     Vitamin D, Cholecalciferol, 25 MCG (1000 UT) CAPS        loratadine-pseudoePHEDrine (CLARITIN-D 24-HOUR)  MG 24 hr tablet Take 1 tablet by mouth daily         ALLERGIES:   Allergies   Allergen Reactions     Animal Dander Unknown     Grass Unknown     Hydrocodone-Acetaminophen Unknown     Annotation: patient does not respond to this medication       Hydromorphone Unknown     Annotation: patient gets dizzy, headache, and does not respond to this       Hydroxyzine Unknown     Annotation: does not work    Annotation: does not work       Iodinated Contrast Media [Diagnostic X-Ray Materials] Itching     Other       Morphine Unknown     Annotation: patient does not respond to this medication       Ondansetron Unknown     Annotation: does not work       Oxycodone-Acetaminophen Unknown     Annotation: patient does not respond to this medication         Lab on 05/19/2022   Component Date Value Ref Range Status     F-Actin (Smooth Muscle) Ab, IgG by* 05/19/2022 6  0 - 19 Units Final    Comment:    If F-Actin (Smooth Muscle) Antibody, IgG is negative, the   Smooth Muscle Antibody titer by IFA is not performed.  REFERENCE INTERVAL: F-Actin (Smooth Muscle) Antibody, IgG   by                       ROXANN      19 Units or less ....... Negative    20 - 30 Units .......... Weak Positive-Suggest repeat                             testing in two to three weeks                             with fresh specimen.    31 Units or greater..... Positive-Suggestive of                             autoimmune hepatitis type 1                             or chronic active hepatitis.    F-actin IgG antibodies have been shown to have increased   sensitivity for autoimmune hepatitis (AIH) but lower   specificity than smooth muscle antibodies (SMA). F-actin   IgG antibodies can also be seen in SMA-negative disease   controls (non-AIH), especially in patients with primary  "  biliary cirrhosis and chronic hepatitis C infections. Some   patients with AIH may be SMA-positive but negative for   F-actin IgG.                            Consider testing for SMA by IFA if suspicion   for AIH is strong.  Performed By: [facility]  [address] [place] [address]  : Odalis Hollingsworth MD     Hepatitis C Antibody 05/19/2022 Nonreactive  Nonreactive Final       PHYSICAL EXAM:  Objective    BP (!) 144/83 (BP Location: Left arm, Patient Position: Sitting, Cuff Size: Adult Large)   Pulse 84   Ht 1.702 m (5' 7\")   Wt 98.2 kg (216 lb 8 oz)   SpO2 97%   BMI 33.91 kg/m    Body mass index is 33.91 kg/m .  Physical Exam   GENERAL: Healthy, alert and no distress  EYES: Eyes grossly normal to inspection.  No discharge or erythema, or obvious scleral/conjunctival abnormalities.  RESP: No audible wheeze, cough, or visible cyanosis.  No visible retractions or increased work of breathing.    SKIN: Visible skin clear. No significant rash, abnormal pigmentation or lesions.  NEURO: Cranial nerves grossly intact.  Mentation and speech appropriate for age.  PSYCH: Mentation appears normal, affect normal/bright, judgement and insight intact, normal speech and appearance well-groomed.          Sincerely,    Bailey Evans PA-C          [facility]    Referred by: Dr Horta    Patient name: Darlene Rhodes    56 year old female    BMI: 33    Type 2 Diabetes?: No. Fasting glucose 112.  No A1C in Epic.    Hepatology/Fibroscan? Saw Dr Horta and fibroscan May 2022    The median liver stiffness was 5.2 kPa with IQR/Median percentage of 5 %.     The measure CAP ultrasound attenuation rate value was 330 dB/m.     IMPRESSION:       1. Estimated liver fibrosis is Stage 0-1   2. Steatosis Grade S2        Recent labs:   Lab Results   Component Value Date      05/19/2022     POTASSIUM 4.0 05/19/2022     CHLORIDE 105 05/19/2022     CO2 27 05/19/2022     BUN 11 05/19/2022 "     CR 0.84 05/19/2022            Lab Results   Component Value Date     BILITOTAL 0.4 05/19/2022      05/19/2022     AST 50 05/19/2022     ALKPHOS 72 05/19/2022            Lab Results   Component Value Date     ALBUMIN 3.8 05/19/2022     PROTTOTAL 7.5 05/19/2022            Lab Results   Component Value Date     WBC 5.4 05/19/2022     HGB 13.8 05/19/2022     MCV 89 05/19/2022      05/19/2022          Liver imaging:     Abd US 2018:  IMPRESSION:  CONCLUSION:  1.  Hepatic steatosis.  2.  Small right renal cyst.  (Spleen size was normal)         FIB-4 Score:     Interested in sleeve gastrectomy?: No BMI 33    Interested in research?: No    ASSESSMENT/PLAN:  1. Bailey RIOJAS   2. Elayne CARRION video 145 or possibly add on to in person visit after Bailey or before.

## 2022-06-01 NOTE — LETTER
Date:June 2, 2022      Provider requested that no letter be sent. Do not send.       [facility]

## 2022-06-01 NOTE — NURSING NOTE
"(   Chief Complaint   Patient presents with     New Patient     New MWM    )    ( Weight: 216 lb 8 oz )  ( Height: 5' 7\" )  ( BMI (Calculated): 33.91 )  (   )  (   )  (   )  (   )  (   )  (   )    ( BP: (!) 144/83 )  (   )  (   )  (   )  ( Pulse: 84 )  (   )  ( SpO2: 97 % )    (   Patient Active Problem List   Diagnosis     CYP2D6 poor metabolizer (H)     Ulcer of esophagus without bleeding     HH (hiatus hernia)     Gastroesophageal reflux disease without esophagitis     Multiple gastric polyps     Cervical high risk HPV (human papillomavirus) test positive    )  (   Current Outpatient Medications   Medication Sig Dispense Refill     LANsoprazole (PREVACID) 30 MG DR capsule [LANSOPRAZOLE (PREVACID) 30 MG CAPSULE] TAKE 1 CAPSULE (30 MG TOTAL) BY MOUTH DAILY. 90 capsule 3     lidocaine, viscous, (XYLOCAINE) 2 % solution Swish and swallow 30 mLs in mouth every 3 hours as needed for moderate pain or pain (Heartburn) ; Max 8 doses/24 hour period. 100 mL 3     loratadine-pseudoePHEDrine (CLARITIN-D 24-HOUR)  MG 24 hr tablet 1 tab PO daily 30 tablet 3     Vitamin D, Cholecalciferol, 25 MCG (1000 UT) CAPS        loratadine-pseudoePHEDrine (CLARITIN-D 24-HOUR)  MG 24 hr tablet Take 1 tablet by mouth daily      )  ( Diabetes Eval:    )    ( Pain Eval:  No Pain (0) )    ( Wound Eval:       )    (   History   Smoking Status     Never Smoker   Smokeless Tobacco     Never Used    )    ( Signed By:  Treasure Larson, EMT; June 1, 2022; 11:01 AM )  "

## 2022-06-01 NOTE — PATIENT INSTRUCTIONS
Consider GLP1, check insurance for coverage for Saxenda or Wegovy. Check list CYP2D6  Labs today first floor  See RD today      WEGOVY (semaglutide)    What is Wegovy?    Wegovy (semaglutide) injection 2.4 mg is an injectable prescription medicine used for adults with obesity (BMI ?30) or overweight (excess weight) (BMI ?27) who also have weight-related medical problems to help them lose weight and keep the weight off.    1.  Start Wegovy (semaglutide) 0.25 mg once weekly for 4 weeks, then if tolerating increase to 0.5 mg weekly for 4 weeks, then if tolerating increase to 1 mg weekly for 4 weeks, then if tolerating increase to 1.7 mg weekly for 4 weeks, then if tolerating increase to 2.4 mg weekly thereafter.    -Each Wegovy pen is a once weekly single-dose prefilled pen with a pen injector already built within the pen. Discard the Wegovy pen after use in sharps container.     2. Storage: make sure that when you get the prescription that you store the prescription in the refrigerator until it is time to use the Wegovy pen.  Once it is time to use the Wegovy pen, you can keep the pen at room temperature and it is good for up to 28 days at room temperature.     3.  Potential common side effects: nausea, headache, diarrhea, stomach upset.  If these become unmanageable or concerning symptoms, please make sure to call or [facility] [employer].      Go to site: Wegovy video to learn more and watch instruction videos.      For any questions or concerns please send a [employer] message to our team or call our weight management call center at [phone] during regular business hours. For questions during evenings or weekends your messages will be addressed during the next business day.  For emergencies please call 911 or seek immediate medical care.       SAXENDA (liralutide)    We are considering starting a GLP-1 (Glucagon-like Peptide-1) medication called Saxenda. One of the ways it works is by slowing down the rate that food leaves your  stomach. You feel zelaya and will eat less. It also helps regulate hormones that can help improve your blood sugars.    If you are a patient that checks blood sugars, continue to check as instructed by your doctor. Low blood sugars are rare but can happen if patients are on insulin or other oral agents. If you notice consistent low sugars or high sugars, your medication may need to be adjusted after your appointment. If this is the case, please call RN and provide her your blood sugar record from the last 3-4 days. The RN will get in touch with the doctor and call you back/Sport message with recommendations. We tolerate high sugars for a bit, so if sugars are running 180-200, this is ok. As weight starts dropping the blood sugars should too. If readings are consistently over 200 for 1-2 weeks, then you should call the doctor/nurse.    Dosing for this medication:   Week 1- Inject 0.6 mg daily  Week 2- Inject 1.2 mg daily  Week 3- Inject 1.8 mg daily  Week 4- Inject 2.4 mg daily  Week 5 and thereafter- Inject 3.0 mg daily    Side effects of GLP- Medications include: The most common side effects are all GI related and consist of: nausea, constipation, diarrhea, burping, or gassiness. Patients are advised to eat slowly and less, and nausea typically passes if people can stick it out.     The risk of pancreatitis (inflammation of the pancreas) has been associated with this type of medication, but is very rare.  If you have had pancreatitis in the past, this medication may not be for you. Please let us know about any past history of pancreas problems.    Symptoms of pancreatitis include: Pain in your upper stomach area which may travel to your back and be worse after eating. Your stomach area may be tender to the touch.  You may have vomiting or nausea and/or have a fever. If you should develop any of these symptoms, stop the medication and contact your primary care doctor. They will do a blood test to check for  pancreatitis.         There is a small chance you may have some low blood sugar after taking the medication.   The signs of low blood sugar are:  Weakness  Shaky   Hungry  Sweating  Confusion      See below for ways to treat low blood sugar without adding in lots of extra calories.      Treating Low Blood Sugar    If you have symptoms of low blood sugar (sweating, shaking, dizzy, confused) eat 15 grams of carbs and wait 15 minutes:    Glucose Tabs are best for sugars under 70 -  Dex4 or BD Glucose tablets are good, you will need to take 3-4 of these to equal 15 grams.     One small box of raisins  4 oz fruit juice box or   cup fruit juice  1 small apple  1 small banana    cup canned fruit in water    English muffin or a slice of bread with jelly   1 low fat frozen waffle with sugar-free syrup    cup cottage cheese with   cup frozen or fresh blueberries  1 cup skim or low-fat milk    cup whole grain cereal  4-6 crackers such as Triscuits      This medication is usually not covered by insurance and can be quite expensive. Sometimes a prior authorization is required, which may take up to 1-2 weeks for an insurance company to make a decision if they will cover the medication. Please be patient, you will be notified after a decision has been made.    For any questions or concerns please send a ITN message to our team or call our weight management call center at [phone] during regular business hours. For questions during evenings or weekends your messages will be addressed during the next business day.  For emergencies please call 911 or seek immediate medical care.      (Do not stop taking it if you don't think it's working. For some people it works without them knowing it.)     In order to get refills of this or any medication we prescribe you must be seen in the medical weight mgmt clinic every 2-4 months. Please have your pharmacy fax a refill request to [phone].      Meal Replacement Products:    Here  is the link to our new e-store where you can purchase our meal replacement products    [facility] E-Store  [email or web address]    The one week starter kit is a great way to sample a variety of products and see what works for you.    If you want more information about the product go to: Fresh OnTheList Meals  [employer]    If you are a [facility] or [facility] employee please contact your care team for a 10% discount code for the estore    Free Shipping for orders over $75     Benefits of meal replacements products:    Portion and calorie control  Improved nutrition  Structured eating  Simplified food choices  Avoid contact with trigger foods    Interested in working with a health ?  Health coaches work with you to improve your overall health and wellbeing.  They look at the whole person, and may involve discussion of different areas of life, including, but not limited to the four pillars of health (sleep, exercise, nutrition, and stress management). Discuss with your care team if you would like to start working a health .     Health Coaching-3 Pack:      $99 for three health coaching visits (self pay; insurance does not cover health coaching)    Visits are done via phone at this time    Coaching is a partnership between the  and the client; Coaches do not prescribe or diagnose    Coaching helps inspire the client to reach his/her personal goals   - To schedule your first health  visit, call  [phone]  - To find out more about health coaching, contact Health  Sue at [email or web address]

## 2022-06-01 NOTE — PATIENT INSTRUCTIONS
Nutrition Goals  1) Aim to make 2-3 meals/week  2) Recommend plate method (1/2 plate non-starchy vegetables, 1/4 plate lean protein, 1/4 plate carbohydrates - whole grains, fruit, starchy vegetables)    General sodium recommendations: 2300 mg (2.3 g) per day    BMI weight class discussed is equal to 118-159 lbs.     Estimated energy needs at rest of ~1930 kcal/day. For 1 lb weight loss/week needs would be ~1430 kcal/day (at complete rest).     Frozen meal examples that are typically more nutritious  - Healthy Choice  - Lean Cuisine  - Atkins Meals  - Smart Ones    Faster meal components  - Deli meat  - Rotisserie chicken  - Eggs  - Grain cups/bags (such as minute made rice/quiona cups, good & gather grain blends, etc)  - Fresh fruits/vegetables  - Yogurt  - Cottage cheese  - Hummus  - Salad kits  - Canned beans    RESOURCES    [employer]iseo [employer] article showing the USDA MyPlate: http://fvhsib.Valderm.Face to Face Live/3,19159     Protein Sources   [email or web address]     Carbohydrates  [email or web address]     Mindful Eating  [email or web address]     Summary of Volumetrics Eating Plan  [email or web address]     Follow-Up:  1 month    SAM Smith, RD, LD  Clinic #: [phone]

## 2022-06-01 NOTE — LETTER
"6/1/2022       RE: Darlene Rhodes  [address]  [place] [address]     Dear Colleague,    Thank you for referring your patient, Darlene Rhodes, to the [facility] [place] at [facility]. Please see a copy of my visit note below.      New Weight Management Nutrition Consultation    Darlene Rhodes is a 56 year old female presents today for new weight management nutrition consultation.  Patient referred by MAT Willis on June 1st, 2022.    Patient with Co-morbidities of obesity including:  Type II DM no  Renal Failure no  Sleep apnea no  Hypertension no   Dyslipidemia yes  Joint pain no  Back pain no  GERD yes    PMH: fatty liver    Anthropometrics:  Weight 6/1/22: 216 lbs with BMI 33.91    Estimated body mass index is 33.91 kg/m  as calculated from the following:    Height as of an earlier encounter on 6/1/22: 1.702 m (5' 7\").    Weight as of an earlier encounter on 6/1/22: 98.2 kg (216 lb 8 oz).    Medications for Weight Loss:  Checking on coverage for Saxenda or Wegovy    Supplements:  Did not discuss today    Has gone through Menopause    Labs being done today    NUTRITION HISTORY    Pt reports never working with a RD before. She is curious to learn more about dietary recommendations. Pt reports a few people in her family have only lived to 70 - she is hoping for nutrition to help improve her lifespan/overall health.     Hx has always been super active. Was training for the [place] marathon in 2019 when she injured herself. Has done PT. Recently bought an elliptical. Wants to be more active again but worried about injuring herself.     More frozen meals/eating out in recent years due to children moving out. Her youngest (26) recently moved back in. Pt not wanting to cook every day - low energy.     Generally eats slow and feels she has good portion control. If going out to eat meal will last her 2-3 meals.    Typical day  B " - varies: yogurt with granola OR waffle OR english muffin with honey OR bagel with cream cheese OR eggs  L - leftovers   D - varies     Additional information:  Retired      Has 3 children - 26 year old (youngest) currently lives with them    Nutrition Prescription  Recommended energy/nutrient modification.    Nutrition Diagnosis  Obesity r/t long history of positive energy balance aeb BMI >30.    Nutrition Intervention  Reviewed current dietary habits and pts history   Discussed long-term goals pt hopes to accomplish in RD appointments  Reviewed and modified previous goals  Answered pt questions  Coordination of care   Nutrition education - discussed Plate Method, BMI, estimated energy needs, general sodium recommendations, etc.  AVS and handouts via [employer]    Patient demonstrates understanding.    Expected Engagement: good    Nutrition Goals  1) Aim to make 2-3 meals/week  2) Recommend plate method (1/2 plate non-starchy vegetables, 1/4 plate lean protein, 1/4 plate carbohydrates - whole grains, fruit, starchy vegetables)    General sodium recommendations: 2300 mg (2.3 g) per day    BMI weight class discussed is equal to 118-159 lbs.     Estimated energy needs at rest of ~1930 kcal/day. For 1 lb weight loss/week needs would be ~1430 kcal/day (at complete rest).     Frozen meal examples that are typically more nutritious  - Healthy Choice  - Lean Cuisine  - Atkins Meals  - Smart Ones    Faster meal components  - Deli meat  - Rotisserie chicken  - Eggs  - Grain cups/bags (such as minute made rice/quiona cups, good & gather grain blends, etc)  - Fresh fruits/vegetables  - Yogurt  - Cottage cheese  - Hummus  - Salad kits  - Canned beans    RESOURCES    Eliseo [employer] article showing the USDA MyPlate: http://QM Scientifichsib.ADstruc.CorTec/3,04750     Protein Sources   [email or web address]     Carbohydrates  [email or web address]     Mindful Eating  [email or web address]     Summary  of Volumetrics Eating Plan  [email or web address]     Follow-Up:  1 month    Time spent with patient:  35 minutes.  SAM Lal, RD, LD            Again, thank you for allowing me to participate in the care of your patient.      Sincerely,    Elayne Carrion RD

## 2022-06-02 ENCOUNTER — TELEPHONE (OUTPATIENT)
Dept: SURGERY | Facility: CLINIC | Age: 57
End: 2022-06-02
Payer: COMMERCIAL

## 2022-06-02 ENCOUNTER — TELEPHONE (OUTPATIENT)
Dept: ENDOCRINOLOGY | Facility: CLINIC | Age: 57
End: 2022-06-02
Payer: COMMERCIAL

## 2022-06-02 NOTE — TELEPHONE ENCOUNTER
Talked with pt and she was not in a good spot to schedule all appointments. Said she would call back.    1 month with Elayne Carrion  4-6 week with Toya Ragland   3 month with Bailey Evans

## 2022-09-14 ENCOUNTER — ANCILLARY PROCEDURE (OUTPATIENT)
Dept: MAMMOGRAPHY | Facility: CLINIC | Age: 57
End: 2022-09-14
Attending: FAMILY MEDICINE
Payer: COMMERCIAL

## 2022-09-14 DIAGNOSIS — Z12.31 VISIT FOR SCREENING MAMMOGRAM: ICD-10-CM

## 2022-09-14 PROCEDURE — 77063 BREAST TOMOSYNTHESIS BI: CPT | Mod: TC | Performed by: RADIOLOGY

## 2022-09-14 PROCEDURE — 77067 SCR MAMMO BI INCL CAD: CPT | Mod: TC | Performed by: RADIOLOGY

## 2022-12-18 ASSESSMENT — ENCOUNTER SYMPTOMS
ABDOMINAL PAIN: 0
FEVER: 0
PARESTHESIAS: 0
SHORTNESS OF BREATH: 0
BREAST MASS: 0
JOINT SWELLING: 0
PALPITATIONS: 0
EYE PAIN: 0
FREQUENCY: 0
DIZZINESS: 0
SORE THROAT: 0
CONSTIPATION: 0
DYSURIA: 0
NERVOUS/ANXIOUS: 0
CHILLS: 0
HEADACHES: 0
MYALGIAS: 0
HEMATOCHEZIA: 0
ARTHRALGIAS: 0
HEMATURIA: 0
DIARRHEA: 0
NAUSEA: 0
HEARTBURN: 0
COUGH: 0
WEAKNESS: 0

## 2022-12-19 ENCOUNTER — OFFICE VISIT (OUTPATIENT)
Dept: FAMILY MEDICINE | Facility: CLINIC | Age: 57
End: 2022-12-19
Payer: COMMERCIAL

## 2022-12-19 VITALS
TEMPERATURE: 97.8 F | SYSTOLIC BLOOD PRESSURE: 110 MMHG | WEIGHT: 220.7 LBS | BODY MASS INDEX: 34.64 KG/M2 | HEIGHT: 67 IN | OXYGEN SATURATION: 98 % | DIASTOLIC BLOOD PRESSURE: 78 MMHG | HEART RATE: 64 BPM

## 2022-12-19 DIAGNOSIS — E66.811 CLASS 1 OBESITY DUE TO EXCESS CALORIES WITH SERIOUS COMORBIDITY AND BODY MASS INDEX (BMI) OF 34.0 TO 34.9 IN ADULT: ICD-10-CM

## 2022-12-19 DIAGNOSIS — Z00.00 ROUTINE GENERAL MEDICAL EXAMINATION AT A HEALTH CARE FACILITY: Primary | ICD-10-CM

## 2022-12-19 DIAGNOSIS — Z12.11 SCREEN FOR COLON CANCER: ICD-10-CM

## 2022-12-19 DIAGNOSIS — E78.2 MIXED HYPERLIPIDEMIA: ICD-10-CM

## 2022-12-19 DIAGNOSIS — E04.1 NONTOXIC UNINODULAR GOITER: ICD-10-CM

## 2022-12-19 DIAGNOSIS — E88.89 CYP2D6 POOR METABOLIZER (H): ICD-10-CM

## 2022-12-19 DIAGNOSIS — R87.810 CERVICAL HIGH RISK HPV (HUMAN PAPILLOMAVIRUS) TEST POSITIVE: ICD-10-CM

## 2022-12-19 DIAGNOSIS — K76.0 NAFLD (NONALCOHOLIC FATTY LIVER DISEASE): ICD-10-CM

## 2022-12-19 DIAGNOSIS — K21.9 GASTROESOPHAGEAL REFLUX DISEASE WITHOUT ESOPHAGITIS: ICD-10-CM

## 2022-12-19 DIAGNOSIS — E66.09 CLASS 1 OBESITY DUE TO EXCESS CALORIES WITH SERIOUS COMORBIDITY AND BODY MASS INDEX (BMI) OF 34.0 TO 34.9 IN ADULT: ICD-10-CM

## 2022-12-19 PROBLEM — M20.5X9 OTHER ACQUIRED DEFORMITY OF TOE: Status: ACTIVE | Noted: 2022-12-19

## 2022-12-19 PROBLEM — E55.9 VITAMIN D DEFICIENCY: Status: ACTIVE | Noted: 2022-12-19

## 2022-12-19 LAB
ALBUMIN SERPL BCG-MCNC: 4.5 G/DL (ref 3.5–5.2)
ALP SERPL-CCNC: 79 U/L (ref 35–104)
ALT SERPL W P-5'-P-CCNC: 109 U/L (ref 10–35)
ANION GAP SERPL CALCULATED.3IONS-SCNC: 12 MMOL/L (ref 7–15)
AST SERPL W P-5'-P-CCNC: 72 U/L (ref 10–35)
BILIRUB SERPL-MCNC: 0.5 MG/DL
BUN SERPL-MCNC: 11.6 MG/DL (ref 6–20)
CALCIUM SERPL-MCNC: 9.6 MG/DL (ref 8.6–10)
CHLORIDE SERPL-SCNC: 101 MMOL/L (ref 98–107)
CHOLEST SERPL-MCNC: 343 MG/DL
CREAT SERPL-MCNC: 0.85 MG/DL (ref 0.51–0.95)
DEPRECATED HCO3 PLAS-SCNC: 26 MMOL/L (ref 22–29)
GFR SERPL CREATININE-BSD FRML MDRD: 80 ML/MIN/1.73M2
GLUCOSE SERPL-MCNC: 126 MG/DL (ref 70–99)
HBA1C MFR BLD: 5.5 % (ref 0–5.6)
HDLC SERPL-MCNC: 47 MG/DL
LDLC SERPL CALC-MCNC: 249 MG/DL
NONHDLC SERPL-MCNC: 296 MG/DL
POTASSIUM SERPL-SCNC: 4.6 MMOL/L (ref 3.4–5.3)
PROT SERPL-MCNC: 7.2 G/DL (ref 6.4–8.3)
SODIUM SERPL-SCNC: 139 MMOL/L (ref 136–145)
TRIGL SERPL-MCNC: 233 MG/DL
TSH SERPL DL<=0.005 MIU/L-ACNC: 2.13 UIU/ML (ref 0.3–4.2)

## 2022-12-19 PROCEDURE — 99396 PREV VISIT EST AGE 40-64: CPT | Performed by: FAMILY MEDICINE

## 2022-12-19 PROCEDURE — 84443 ASSAY THYROID STIM HORMONE: CPT | Performed by: FAMILY MEDICINE

## 2022-12-19 PROCEDURE — 83036 HEMOGLOBIN GLYCOSYLATED A1C: CPT | Performed by: FAMILY MEDICINE

## 2022-12-19 PROCEDURE — 36415 COLL VENOUS BLD VENIPUNCTURE: CPT | Performed by: FAMILY MEDICINE

## 2022-12-19 PROCEDURE — 80053 COMPREHEN METABOLIC PANEL: CPT | Performed by: FAMILY MEDICINE

## 2022-12-19 PROCEDURE — 99214 OFFICE O/P EST MOD 30 MIN: CPT | Mod: 25 | Performed by: FAMILY MEDICINE

## 2022-12-19 PROCEDURE — 80061 LIPID PANEL: CPT | Performed by: FAMILY MEDICINE

## 2022-12-19 RX ORDER — ATORVASTATIN CALCIUM 10 MG/1
10 TABLET, FILM COATED ORAL AT BEDTIME
Qty: 30 TABLET | Refills: 1 | Status: SHIPPED | OUTPATIENT
Start: 2022-12-19 | End: 2023-01-24

## 2022-12-19 RX ORDER — LIDOCAINE HYDROCHLORIDE 20 MG/ML
30 SOLUTION OROPHARYNGEAL
Qty: 100 ML | Refills: 3 | Status: SHIPPED | OUTPATIENT
Start: 2022-12-19 | End: 2024-04-10

## 2022-12-19 ASSESSMENT — ENCOUNTER SYMPTOMS
FEVER: 0
DIARRHEA: 0
EYE PAIN: 0
FREQUENCY: 0
CHILLS: 0
ABDOMINAL PAIN: 0
WEAKNESS: 0
HEMATURIA: 0
PALPITATIONS: 0
JOINT SWELLING: 0
DYSURIA: 0
HEADACHES: 0
SHORTNESS OF BREATH: 0
PARESTHESIAS: 0
MYALGIAS: 0
SORE THROAT: 0
HEARTBURN: 0
ARTHRALGIAS: 0
DIZZINESS: 0
CONSTIPATION: 0
COUGH: 0
NERVOUS/ANXIOUS: 0
HEMATOCHEZIA: 0
NAUSEA: 0
BREAST MASS: 0

## 2022-12-19 ASSESSMENT — PAIN SCALES - GENERAL: PAINLEVEL: NO PAIN (0)

## 2022-12-19 NOTE — PROGRESS NOTES
SUBJECTIVE:   CC: Darlene is an 57 year old who presents for preventive health visit.     Patient has been advised of split billing requirements and indicates understanding: Yes     Healthy Habits:     Getting at least 3 servings of Calcium per day:  NO    Bi-annual eye exam:  Yes    Dental care twice a year:  Yes    Sleep apnea or symptoms of sleep apnea:  None    Diet:  Regular (no restrictions)    Frequency of exercise:  2-3 days/week    Duration of exercise:  30-45 minutes    Taking medications regularly:  Yes    Medication side effects:  Other    PHQ-2 Total Score: 0    Additional concerns today:  No      PROBLEMS TO ADD ON...  Patient has no specific questions or concerns today.  She has a hiatal hernia that was diagnosed in 2009.  She followed with [facility] previously for this and her most recent colon cancer screening.  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    Today's PHQ-2 Score:   PHQ-2 ( 1999 Pfizer) 12/18/2022   Q1: Little interest or pleasure in doing things 0   Q2: Feeling down, depressed or hopeless 0   PHQ-2 Score 0   PHQ-2 Total Score (12-17 Years)- Positive if 3 or more points; Administer PHQ-A if positive -   Q1: Little interest or pleasure in doing things Not at all   Q2: Feeling down, depressed or hopeless Not at all   PHQ-2 Score 0           Social History     Tobacco Use     Smoking status: Never     Smokeless tobacco: Never   Substance Use Topics     Alcohol use: Yes     Comment: Alcoholic Drinks/day: 1-2 / week     If you drink alcohol do you typically have >3 drinks per day or >7 drinks per week? No      Reviewed orders with patient.  Reviewed health maintenance and updated orders accordingly - Yes    BP Readings from Last 3 Encounters:   12/19/22 110/78   06/01/22 (!) 144/83   05/31/22 128/83    Wt Readings from Last 3 Encounters:   12/19/22 100.1 kg (220 lb  11.2 oz)   06/01/22 98.2 kg (216 lb 8 oz)   05/19/22 97.8 kg (215 lb 11.2 oz)                  Patient Active Problem List   Diagnosis     CYP2D6 poor metabolizer (H)     Ulcer of esophagus without bleeding     HH (hiatus hernia)     Gastroesophageal reflux disease without esophagitis     Multiple gastric polyps     Cervical high risk HPV (human papillomavirus) test positive     Class 1 obesity with serious comorbidity and body mass index (BMI) of 33.0 to 33.9 in adult     NAFLD (nonalcoholic fatty liver disease)     Vitamin D deficiency     Other acquired deformity of toe     Nontoxic uninodular goiter     Hyperlipidemia     Past Surgical History:   Procedure Laterality Date     BIOPSY  2018    endoscopy     COLONOSCOPY  2018    normal screening     FOOT SURGERY Right     bone spur removal     GYN SURGERY  1998    ectopic pregnancy     ORTHOPEDIC SURGERY  2007    removal of bone spur on foot     IN RX ECTOP PREG BY SCOPE,RMV TUBE/OVRY      Description: Laparoscopic Excision Of Ectopic Pregnancy And Salpingectomy;  Recorded: 08/31/2010;       Social History     Tobacco Use     Smoking status: Never     Smokeless tobacco: Never   Substance Use Topics     Alcohol use: Yes     Comment: Alcoholic Drinks/day: 1-2 / week     Family History   Problem Relation Age of Onset     No Known Problems Mother      No Known Problems Father      No Known Problems Sister      No Known Problems Sister      No Known Problems Sister      No Known Problems Maternal Grandmother      No Known Problems Maternal Grandfather      Anuerysm Paternal Grandmother         brain     Prostate Cancer Paternal Grandfather      Heart Defect Daughter         congenital septal defects     Lymphoma Daughter 25     No Known Problems Daughter          Current Outpatient Medications   Medication Sig Dispense Refill     LANsoprazole (PREVACID) 30 MG DR capsule [LANSOPRAZOLE (PREVACID) 30 MG CAPSULE] TAKE 1 CAPSULE (30 MG TOTAL) BY MOUTH DAILY. 90 capsule 3      lidocaine, viscous, (XYLOCAINE) 2 % solution Swish and swallow 30 mLs in mouth every 3 hours as needed for moderate pain or pain (Heartburn) ; Max 8 doses/24 hour period. 100 mL 3     loratadine-pseudoePHEDrine (CLARITIN-D 24-HOUR)  MG 24 hr tablet 1 tab PO daily 30 tablet 3     Vitamin D (Cholecalciferol) 25 MCG (1000 UT) CAPS        Allergies   Allergen Reactions     Animal Dander Unknown     Grass Unknown     Hydrocodone-Acetaminophen Unknown     Annotation: patient does not respond to this medication       Hydromorphone Unknown     Annotation: patient gets dizzy, headache, and does not respond to this       Hydroxyzine Unknown     Annotation: does not work    Annotation: does not work       Iodinated Contrast Media [Diagnostic X-Ray Materials] Itching     Other       Morphine Unknown     Annotation: patient does not respond to this medication       Ondansetron Unknown     Annotation: does not work       Oxycodone-Acetaminophen Unknown     Annotation: patient does not respond to this medication         Breast Cancer Screening:    Breast CA Risk Assessment (FHS-7) 12/8/2021   Do you have a family history of breast, colon, or ovarian cancer? No / Unknown         Mammogram Screening: Recommended mammography every 1-2 years with patient discussion and risk factor consideration  Pertinent mammograms are reviewed under the imaging tab.    History of abnormal Pap smear: NO - age 30-65 PAP every 5 years with negative HPV co-testing recommended  PAP / HPV Latest Ref Rng & Units 4/12/2022   PAP   Negative for Intraepithelial Lesion or Malignancy (NILM)   HPV16 Negative Negative   HPV18 Negative Negative   HRHPV Negative Positive(A)     Reviewed and updated as needed this visit by clinical staff   Tobacco  Allergies  Meds              Reviewed and updated as needed this visit by Provider   Tobacco  Allergies  Meds  Problems  Med Hx  Surg Hx  Fam Hx  Soc   Hx         Review of Systems   Constitutional:  "Negative for chills and fever.   HENT: Negative for congestion, ear pain, hearing loss and sore throat.    Eyes: Negative for pain and visual disturbance.   Respiratory: Negative for cough and shortness of breath.    Cardiovascular: Negative for chest pain, palpitations and peripheral edema.   Gastrointestinal: Negative for abdominal pain, constipation, diarrhea, heartburn, hematochezia and nausea.   Breasts:  Negative for tenderness, breast mass and discharge.   Genitourinary: Negative for dysuria, frequency, genital sores, hematuria, pelvic pain, urgency, vaginal bleeding and vaginal discharge.   Musculoskeletal: Negative for arthralgias, joint swelling and myalgias.   Skin: Negative for rash.   Neurological: Negative for dizziness, weakness, headaches and paresthesias.   Psychiatric/Behavioral: Negative for mood changes. The patient is not nervous/anxious.         OBJECTIVE:   /78 (BP Location: Left arm, Patient Position: Sitting, Cuff Size: Adult Large)   Pulse 64   Temp 97.8  F (36.6  C) (Oral)   Ht 1.71 m (5' 7.32\")   Wt 100.1 kg (220 lb 11.2 oz)   SpO2 98%   BMI 34.24 kg/m    Physical Exam  GENERAL APPEARANCE: healthy, alert and no distress  EYES: Eyes grossly normal to inspection, PERRL and conjunctivae and sclerae normal  HENT: ear canals and TM's normal, nose and mouth without ulcers or lesions, oropharynx clear and oral mucous membranes moist  NECK: no adenopathy, no asymmetry, masses, or scars and thyroid normal to palpation  RESP: lungs clear to auscultation - no rales, rhonchi or wheezes  BREAST: normal without masses, tenderness or nipple discharge and no palpable axillary masses or adenopathy  CV: regular rate and rhythm, normal S1 S2, no S3 or S4, no murmur, click or rub, no peripheral edema and peripheral pulses strong  ABDOMEN: soft, nontender, no hepatosplenomegaly, no masses and bowel sounds normal  MS: no musculoskeletal defects are noted and gait is age appropriate without " ataxia  SKIN: no suspicious lesions or rashes  NEURO: Normal strength and tone, sensory exam grossly normal, mentation intact and speech normal  PSYCH: mentation appears normal and affect normal/bright    Diagnostic Test Results:  Labs reviewed in Epic  Results for orders placed or performed in visit on 12/19/22   Lipid panel reflex to direct LDL Fasting     Status: Abnormal   Result Value Ref Range    Cholesterol 343 (H) <200 mg/dL    Triglycerides 233 (H) <150 mg/dL    Direct Measure HDL 47 (L) >=50 mg/dL    LDL Cholesterol Calculated 249 (H) <=100 mg/dL    Non HDL Cholesterol 296 (H) <130 mg/dL    Narrative    Cholesterol  Desirable:  <200 mg/dL    Triglycerides  Normal:  Less than 150 mg/dL  Borderline High:  150-199 mg/dL  High:  200-499 mg/dL  Very High:  Greater than or equal to 500 mg/dL    Direct Measure HDL  Female:  Greater than or equal to 50 mg/dL   Male:  Greater than or equal to 40 mg/dL    LDL Cholesterol  Desirable:  <100mg/dL  Above Desirable:  100-129 mg/dL   Borderline High:  130-159 mg/dL   High:  160-189 mg/dL   Very High:  >= 190 mg/dL    Non HDL Cholesterol  Desirable:  130 mg/dL  Above Desirable:  130-159 mg/dL  Borderline High:  160-189 mg/dL  High:  190-219 mg/dL  Very High:  Greater than or equal to 220 mg/dL   Comprehensive metabolic panel (BMP + Alb, Alk Phos, ALT, AST, Total. Bili, TP)     Status: Abnormal   Result Value Ref Range    Sodium 139 136 - 145 mmol/L    Potassium 4.6 3.4 - 5.3 mmol/L    Chloride 101 98 - 107 mmol/L    Carbon Dioxide (CO2) 26 22 - 29 mmol/L    Anion Gap 12 7 - 15 mmol/L    Urea Nitrogen 11.6 6.0 - 20.0 mg/dL    Creatinine 0.85 0.51 - 0.95 mg/dL    Calcium 9.6 8.6 - 10.0 mg/dL    Glucose 126 (H) 70 - 99 mg/dL    Alkaline Phosphatase 79 35 - 104 U/L    AST 72 (H) 10 - 35 U/L     (H) 10 - 35 U/L    Protein Total 7.2 6.4 - 8.3 g/dL    Albumin 4.5 3.5 - 5.2 g/dL    Bilirubin Total 0.5 <=1.2 mg/dL    GFR Estimate 80 >60 mL/min/1.73m2   Hemoglobin A1c      Status: Normal   Result Value Ref Range    Hemoglobin A1C 5.5 0.0 - 5.6 %   TSH with free T4 reflex     Status: Normal   Result Value Ref Range    TSH 2.13 0.30 - 4.20 uIU/mL       ASSESSMENT/PLAN:   1. Routine general medical examination at a health care facility  Routine history and physical, updated in EMR.  Labs updated as below.  Immunizations up to date.  Had patient sign CHIQUITA for last colonoscopy done at [facility], unable to see the report to see what the recommended follow-up interval was.  Plan repeat physical in 1 year.  - Lipid panel reflex to direct LDL Fasting  - Comprehensive metabolic panel (BMP + Alb, Alk Phos, ALT, AST, Total. Bili, TP)  - Hemoglobin A1c  - TSH with free T4 reflex    2. CYP2D6 poor metabolizer (H)  Patient wanted this noted in her record as it affects her ability to metabolize certain medications.    3. NAFLD (nonalcoholic fatty liver disease)  Patient has seen hepatology.  Emphasized the importance of healthy diet and regular exercise along with abstinence from alcohol.  - Comprehensive metabolic panel (BMP + Alb, Alk Phos, ALT, AST, Total. Bili, TP)  - Hemoglobin A1c  - Hepatic panel; Future    4. Nontoxic uninodular goiter  TSH is within normal range.  - TSH with free T4 reflex    5. Gastroesophageal reflux disease without esophagitis  Continues on Prevacid.  Likes to have viscous lidocaine on hand but rarely uses this.  - lidocaine, viscous, (XYLOCAINE) 2 % solution; Swish and swallow 30 mLs in mouth every 3 hours as needed for moderate pain (4-6) or pain (Heartburn) ; Max 8 doses/24 hour period.  Dispense: 100 mL; Refill: 3    6. Class 1 obesity due to excess calories with serious comorbidity and body mass index (BMI) of 34.0 to 34.9 in adult  Emphasized the importance of healthy diet and regular exercise for weight loss and liver health.  - Lipid panel reflex to direct LDL Fasting  - Comprehensive metabolic panel (BMP + Alb, Alk Phos, ALT, AST, Total. Bili, TP)  - Hemoglobin  "A1c    7. Mixed hyperlipidemia  Cholesterol is very high.  Recommended Lipitor to reduce ASCVD risk.  Plan recheck lipid profile in 1-3 months.  - Lipid panel reflex to direct LDL Fasting  - Comprehensive metabolic panel (BMP + Alb, Alk Phos, ALT, AST, Total. Bili, TP)  - atorvastatin (LIPITOR) 10 MG tablet; Take 1 tablet (10 mg) by mouth At Bedtime  Dispense: 30 tablet; Refill: 1  - Hepatic panel; Future    8. Cervical high risk HPV (human papillomavirus) test positive  Patient was recommended to repeat pap smear in April.  Recommended waiting until April to repeat this.    9. Screen for colon cancer  Recommended sign CHIQUITA for official report from [facility] to determine re-screening interval.      Patient has been advised of split billing requirements and indicates understanding: Yes      COUNSELING:  Reviewed preventive health counseling, as reflected in patient instructions  Special attention given to:        Regular exercise       Healthy diet/nutrition       Colorectal Cancer Screening      BMI:   Estimated body mass index is 34.24 kg/m  as calculated from the following:    Height as of this encounter: 1.71 m (5' 7.32\").    Weight as of this encounter: 100.1 kg (220 lb 11.2 oz).   Weight management plan: Discussed healthy diet and exercise guidelines      She reports that she has never smoked. She has never used smokeless tobacco.      Mabel Parsons MD  [facility]  "

## 2023-01-23 DIAGNOSIS — E78.2 MIXED HYPERLIPIDEMIA: ICD-10-CM

## 2023-01-24 RX ORDER — ATORVASTATIN CALCIUM 10 MG/1
10 TABLET, FILM COATED ORAL AT BEDTIME
Qty: 30 TABLET | Refills: 1 | Status: SHIPPED | OUTPATIENT
Start: 2023-01-24 | End: 2023-04-04

## 2023-01-24 NOTE — TELEPHONE ENCOUNTER
"Last Written Prescription Date:  12/19/2022  Last Fill Quantity: 30,  # refills: 1   Last office visit provider:  12/19/2022     Requested Prescriptions   Pending Prescriptions Disp Refills     atorvastatin (LIPITOR) 10 MG tablet [Pharmacy Med Name: ATORVASTATIN 10MG TABLET** 10 Tablet] 30 tablet 1     Sig: TAKE 1 TABLET (10 MG) BY MOUTH AT BEDTIME       Statins Protocol Passed - 1/23/2023  4:21 PM        Passed - LDL on file in past 12 months     Recent Labs   Lab Test 12/19/22  0915   *             Passed - No abnormal creatine kinase in past 12 months     No lab results found.             Passed - Recent (12 mo) or future (30 days) visit within the authorizing provider's specialty     Patient has had an office visit with the authorizing provider or a provider within the authorizing providers department within the previous 12 mos or has a future within next 30 days. See \"Patient Info\" tab in inbasket, or \"Choose Columns\" in Meds & Orders section of the refill encounter.              Passed - Medication is active on med list        Passed - Patient is age 18 or older        Passed - No active pregnancy on record        Passed - No positive pregnancy test in past 12 months             Baylee Sharma RN 01/24/23 4:18 PM  "

## 2023-01-30 ENCOUNTER — LAB (OUTPATIENT)
Dept: LAB | Facility: CLINIC | Age: 58
End: 2023-01-30
Payer: COMMERCIAL

## 2023-01-30 DIAGNOSIS — K76.0 NAFLD (NONALCOHOLIC FATTY LIVER DISEASE): ICD-10-CM

## 2023-01-30 DIAGNOSIS — E78.2 MIXED HYPERLIPIDEMIA: ICD-10-CM

## 2023-01-30 LAB
ALBUMIN SERPL BCG-MCNC: 4.7 G/DL (ref 3.5–5.2)
ALP SERPL-CCNC: 84 U/L (ref 35–104)
ALT SERPL W P-5'-P-CCNC: 102 U/L (ref 10–35)
AST SERPL W P-5'-P-CCNC: 72 U/L (ref 10–35)
BILIRUB DIRECT SERPL-MCNC: <0.2 MG/DL (ref 0–0.3)
BILIRUB SERPL-MCNC: 0.4 MG/DL
CHOLEST SERPL-MCNC: 225 MG/DL
HDLC SERPL-MCNC: 56 MG/DL
LDLC SERPL CALC-MCNC: 131 MG/DL
NONHDLC SERPL-MCNC: 169 MG/DL
PROT SERPL-MCNC: 7.5 G/DL (ref 6.4–8.3)
TRIGL SERPL-MCNC: 190 MG/DL

## 2023-01-30 PROCEDURE — 36415 COLL VENOUS BLD VENIPUNCTURE: CPT

## 2023-01-30 PROCEDURE — 80076 HEPATIC FUNCTION PANEL: CPT

## 2023-01-30 PROCEDURE — 80061 LIPID PANEL: CPT

## 2023-03-28 ENCOUNTER — PATIENT OUTREACH (OUTPATIENT)
Dept: OBGYN | Facility: CLINIC | Age: 58
End: 2023-03-28
Payer: COMMERCIAL

## 2023-03-28 PROBLEM — R87.810 CERVICAL HIGH RISK HPV (HUMAN PAPILLOMAVIRUS) TEST POSITIVE: Status: ACTIVE | Noted: 2022-04-12

## 2023-04-04 DIAGNOSIS — E78.2 MIXED HYPERLIPIDEMIA: ICD-10-CM

## 2023-04-04 RX ORDER — ATORVASTATIN CALCIUM 10 MG/1
10 TABLET, FILM COATED ORAL AT BEDTIME
Qty: 30 TABLET | Refills: 8 | Status: SHIPPED | OUTPATIENT
Start: 2023-04-04 | End: 2024-01-18

## 2023-04-04 NOTE — TELEPHONE ENCOUNTER
Prescription approved per [facility] Refill Protocol.  AMY OlsonN, RN  [facility]

## 2023-05-08 DIAGNOSIS — R79.89 ELEVATED LIVER FUNCTION TESTS: Primary | ICD-10-CM

## 2023-05-11 ENCOUNTER — OFFICE VISIT (OUTPATIENT)
Dept: OBGYN | Facility: CLINIC | Age: 58
End: 2023-05-11
Payer: COMMERCIAL

## 2023-05-11 VITALS
HEIGHT: 67 IN | WEIGHT: 219 LBS | DIASTOLIC BLOOD PRESSURE: 70 MMHG | HEART RATE: 100 BPM | BODY MASS INDEX: 34.37 KG/M2 | OXYGEN SATURATION: 97 % | SYSTOLIC BLOOD PRESSURE: 128 MMHG

## 2023-05-11 DIAGNOSIS — Z12.4 CERVICAL CANCER SCREENING: Primary | ICD-10-CM

## 2023-05-11 PROCEDURE — 87624 HPV HI-RISK TYP POOLED RSLT: CPT | Performed by: OBSTETRICS & GYNECOLOGY

## 2023-05-11 PROCEDURE — 99213 OFFICE O/P EST LOW 20 MIN: CPT | Performed by: OBSTETRICS & GYNECOLOGY

## 2023-05-11 PROCEDURE — G0145 SCR C/V CYTO,THINLAYER,RESCR: HCPCS | Performed by: OBSTETRICS & GYNECOLOGY

## 2023-05-11 NOTE — PROGRESS NOTES
"S: The patient is here in follow up of her Pap from last year.  See note from 4/12/22.  That Pap was NILM with +other HRHPV.  This was the first time she had HPV that she is aware of.    Outpatient Medications Prior to Visit   Medication Sig Dispense Refill     atorvastatin (LIPITOR) 10 MG tablet TAKE 1 TABLET (10 MG) BY MOUTH AT BEDTIME 30 tablet 8     LANsoprazole (PREVACID) 30 MG DR capsule [LANSOPRAZOLE (PREVACID) 30 MG CAPSULE] TAKE 1 CAPSULE (30 MG TOTAL) BY MOUTH DAILY. 90 capsule 3     lidocaine, viscous, (XYLOCAINE) 2 % solution Swish and swallow 30 mLs in mouth every 3 hours as needed for moderate pain (4-6) or pain (Heartburn) ; Max 8 doses/24 hour period. 100 mL 3     loratadine-pseudoePHEDrine (CLARITIN-D 24-HOUR)  MG 24 hr tablet 1 tab PO daily 30 tablet 3     Vitamin D (Cholecalciferol) 25 MCG (1000 UT) CAPS  (Patient not taking: Reported on 5/11/2023)       No facility-administered medications prior to visit.       Patient is allergic to animal dander, grass, hydrocodone-acetaminophen, hydromorphone, hydroxyzine, iodinated contrast media [iodinated contrast media], morphine, ondansetron, and oxycodone-acetaminophen.    O:  /70 (BP Location: Right arm, Patient Position: Sitting, Cuff Size: Adult Regular)   Pulse 100   Ht 1.71 m (5' 7.32\")   Wt 99.3 kg (219 lb)           Body mass index is 33.97 kg/m .    General: obese WF, NAD  Pelvic: EG/BUS no lesions, no skin change   Vagina no lesions, no discharge   Cervix no lesions, no cervical motion tenderness   Perineum no lesions   Rectal deferred    Assessment: 57 year old MWF  with a history of HRHPV.    Plan:  Pap done.  Follow depending on the results.  Questions were answered to the best of my ability.    20 minutes spent on the date of the encounter doing chart review, review of test results, interpretation of tests, patient visit and documentation  "

## 2023-05-15 ENCOUNTER — PRE VISIT (OUTPATIENT)
Dept: GASTROENTEROLOGY | Facility: CLINIC | Age: 58
End: 2023-05-15
Payer: COMMERCIAL

## 2023-05-15 ENCOUNTER — OFFICE VISIT (OUTPATIENT)
Dept: GASTROENTEROLOGY | Facility: CLINIC | Age: 58
End: 2023-05-15
Attending: PHYSICIAN ASSISTANT
Payer: COMMERCIAL

## 2023-05-15 ENCOUNTER — LAB (OUTPATIENT)
Dept: LAB | Facility: CLINIC | Age: 58
End: 2023-05-15
Attending: PHYSICIAN ASSISTANT
Payer: COMMERCIAL

## 2023-05-15 VITALS
TEMPERATURE: 98.2 F | OXYGEN SATURATION: 95 % | DIASTOLIC BLOOD PRESSURE: 85 MMHG | BODY MASS INDEX: 34.13 KG/M2 | WEIGHT: 220 LBS | HEART RATE: 92 BPM | SYSTOLIC BLOOD PRESSURE: 130 MMHG

## 2023-05-15 DIAGNOSIS — K76.0 NAFLD (NONALCOHOLIC FATTY LIVER DISEASE): Primary | ICD-10-CM

## 2023-05-15 DIAGNOSIS — E78.2 MIXED HYPERLIPIDEMIA: ICD-10-CM

## 2023-05-15 DIAGNOSIS — E66.09 CLASS 1 OBESITY DUE TO EXCESS CALORIES WITH SERIOUS COMORBIDITY AND BODY MASS INDEX (BMI) OF 34.0 TO 34.9 IN ADULT: ICD-10-CM

## 2023-05-15 DIAGNOSIS — R79.89 ELEVATED LIVER FUNCTION TESTS: ICD-10-CM

## 2023-05-15 DIAGNOSIS — E66.811 CLASS 1 OBESITY DUE TO EXCESS CALORIES WITH SERIOUS COMORBIDITY AND BODY MASS INDEX (BMI) OF 34.0 TO 34.9 IN ADULT: ICD-10-CM

## 2023-05-15 LAB
ALBUMIN SERPL BCG-MCNC: 4.6 G/DL (ref 3.5–5.2)
ALP SERPL-CCNC: 84 U/L (ref 35–104)
ALT SERPL W P-5'-P-CCNC: 61 U/L (ref 10–35)
ANION GAP SERPL CALCULATED.3IONS-SCNC: 10 MMOL/L (ref 7–15)
AST SERPL W P-5'-P-CCNC: 44 U/L (ref 10–35)
BILIRUB DIRECT SERPL-MCNC: <0.2 MG/DL (ref 0–0.3)
BILIRUB SERPL-MCNC: 0.4 MG/DL
BUN SERPL-MCNC: 10.5 MG/DL (ref 6–20)
CALCIUM SERPL-MCNC: 9.8 MG/DL (ref 8.6–10)
CHLORIDE SERPL-SCNC: 100 MMOL/L (ref 98–107)
CREAT SERPL-MCNC: 0.91 MG/DL (ref 0.51–0.95)
DEPRECATED HCO3 PLAS-SCNC: 27 MMOL/L (ref 22–29)
ERYTHROCYTE [DISTWIDTH] IN BLOOD BY AUTOMATED COUNT: 12.9 % (ref 10–15)
GFR SERPL CREATININE-BSD FRML MDRD: 74 ML/MIN/1.73M2
GLUCOSE SERPL-MCNC: 102 MG/DL (ref 70–99)
HBV CORE AB SERPL QL IA: NONREACTIVE
HCT VFR BLD AUTO: 41.8 % (ref 35–47)
HGB BLD-MCNC: 14.1 G/DL (ref 11.7–15.7)
INR PPP: 0.93 (ref 0.85–1.15)
MCH RBC QN AUTO: 29.6 PG (ref 26.5–33)
MCHC RBC AUTO-ENTMCNC: 33.7 G/DL (ref 31.5–36.5)
MCV RBC AUTO: 88 FL (ref 78–100)
PLATELET # BLD AUTO: 263 10E3/UL (ref 150–450)
POTASSIUM SERPL-SCNC: 4.7 MMOL/L (ref 3.4–5.3)
PROT SERPL-MCNC: 7.3 G/DL (ref 6.4–8.3)
RBC # BLD AUTO: 4.77 10E6/UL (ref 3.8–5.2)
SODIUM SERPL-SCNC: 137 MMOL/L (ref 136–145)
WBC # BLD AUTO: 4.7 10E3/UL (ref 4–11)

## 2023-05-15 PROCEDURE — 82248 BILIRUBIN DIRECT: CPT | Performed by: PATHOLOGY

## 2023-05-15 PROCEDURE — 99214 OFFICE O/P EST MOD 30 MIN: CPT | Performed by: PHYSICIAN ASSISTANT

## 2023-05-15 PROCEDURE — 86704 HEP B CORE ANTIBODY TOTAL: CPT | Mod: 90 | Performed by: PATHOLOGY

## 2023-05-15 PROCEDURE — 99000 SPECIMEN HANDLING OFFICE-LAB: CPT | Performed by: PATHOLOGY

## 2023-05-15 PROCEDURE — 36415 COLL VENOUS BLD VENIPUNCTURE: CPT | Performed by: PATHOLOGY

## 2023-05-15 PROCEDURE — 85027 COMPLETE CBC AUTOMATED: CPT | Performed by: PATHOLOGY

## 2023-05-15 PROCEDURE — 85610 PROTHROMBIN TIME: CPT | Performed by: PATHOLOGY

## 2023-05-15 PROCEDURE — G0463 HOSPITAL OUTPT CLINIC VISIT: HCPCS | Performed by: PHYSICIAN ASSISTANT

## 2023-05-15 PROCEDURE — 80053 COMPREHEN METABOLIC PANEL: CPT | Performed by: PATHOLOGY

## 2023-05-15 ASSESSMENT — PAIN SCALES - GENERAL: PAINLEVEL: NO PAIN (0)

## 2023-05-15 NOTE — PROGRESS NOTES
Hepatology Follow-up Clinic note  Darlene Rhodes   Date of Birth 1965  Date of Service 5/14/2023    Reason for follow-up: Hepatic steatosis         Assessment/plan:   Darlene Rhodes is a 57 year old female History of elevated ALT/AST and imaging findings of hepatic steatosis. Transaminases have been historically mildly elevated. Liver function also normal without stigmata of advanced liver disease. FibroScan in May 2022 showing Stage 0-1 fibrosis, 5.2 kilopascals with Grade 2 steatosis.     # NALFD:   - Patient has only a few risk factors for on-going fibrosis (obesity, dyslipidemia). We discussed the pathophysiology and natural history of nonalcoholic fatty liver disease and cirrhosis.     - Recommend FIB-4 yearly with PCP. FIB-4 Calculation: 1.2 at 5/15/2023 11:37 AM  - Recommend slow gradual weight loss.  - Maintain good control of cholesterol (No contraindication to starting a statin with LFT elevations)   - Optimize blood glucose as needed. Could consider GLP-1 inhibitor for both insulin resistance and help with weight loss  - Improve nutrition: continue limiting carbohydrates, especially simple carbohydrates, and following Mediterranean eating patten  - Increase physical activity: maintain physical activity as able   - Limit alcohol intake to not more than 3 drinks a week    # Will also rule out overlapping genetic causes of hepatobiliary disease.   Labs: A1AT deficiency     # Consider/recommend follow up with non-invasive elastography or FibroScan in 3-5 years     # No further follow in Hepatology needed at this time      Anne Marie Bhatia PA-C   [facility]    Total time for E/M services performed on the date of the encounter 30 minutes.  This included review of previous: clinic visits, hospital records, lab results, imaging studies, and procedural documentation. Time also includes patient visit, documentation and discussion with other providers.  The findings from this  review are summarized in the above note.     -----------------------------------------------------       HPI:   Darlene Rhodes is a 57 year old female presenting for follow-up.     Dx: NAFLD  - Risk factors: dyslipidemia, obesity  FibroScan: 5/27/2022, Stage 0-1, 5.2 kilopascals Grade 2 Steatosis    Patient was last seen by Dr. Horta on 5/19/2022. In the ER in at [facility] for severe epigastric pain. Work-up was reassuring and improved with conservative treatment.     Normal appetite. Breakfast/lunch and dinner.   PB sandwich/Pork sandwich / rice    For activity, she has an eliptical machine for winter. Does Gardening in summer.     Patient denies jaundice, lower extremity edema, abdominal distension or confusion.  Patient also denies melena, hematochezia or hematemesis. Patient denies weight loss, fevers, sweats or chills. She does not drink alcohol.     Previous work-up:   Lab Results   Component Value Date    HEPBANG Nonreactive 05/19/2022    AUSAB 64.63 05/19/2022    HCVAB Nonreactive 05/19/2022    JULIETA 31 08/04/2021    SMOOTHMUS 6 05/19/2022    TSH 2.13 12/19/2022    CHOL 225 (H) 01/30/2023    HDL 56 01/30/2023     (H) 01/30/2023    TRIG 190 (H) 01/30/2023    A1C 5.5 12/19/2022      No results found for: SPECDES, LDRESULTS, LDINTERP, LDMETHOD, LDCOMMENTS, LDDISCLAIMER      Medical hx Surgical hx   Past Medical History:   Diagnosis Date     Cervical high risk HPV (human papillomavirus) test positive 04/12/2022     Gastroesophageal reflux disease with esophagitis      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)     Past Surgical History:   Procedure Laterality Date     BIOPSY  2018    endoscopy     COLONOSCOPY  2018    normal screening     FOOT SURGERY Right     bone spur removal     GYN SURGERY  1998    ectopic pregnancy     ORTHOPEDIC SURGERY  2007    removal of bone spur on foot     [facility] ECTOP PREG BY SCOPE,RMV TUBE/OVRY      Description: Laparoscopic Excision Of Ectopic Pregnancy  And Salpingectomy;  Recorded: 08/31/2010;                 Medications:     Current Outpatient Medications   Medication     atorvastatin (LIPITOR) 10 MG tablet     LANsoprazole (PREVACID) 30 MG DR capsule     lidocaine, viscous, (XYLOCAINE) 2 % solution     loratadine-pseudoePHEDrine (CLARITIN-D 24-HOUR)  MG 24 hr tablet     Vitamin D (Cholecalciferol) 25 MCG (1000 UT) CAPS     No current facility-administered medications for this visit.            Allergies:     Allergies   Allergen Reactions     Animal Dander Unknown     Grass Unknown     Hydrocodone-Acetaminophen Unknown     Annotation: patient does not respond to this medication       Hydromorphone Unknown     Annotation: patient gets dizzy, headache, and does not respond to this       Hydroxyzine Unknown     Annotation: does not work    Annotation: does not work       Iodinated Contrast Media [Iodinated Contrast Media] Itching     Other       Morphine Unknown     Annotation: patient does not respond to this medication       Ondansetron Unknown     Annotation: does not work       Oxycodone-Acetaminophen Unknown     Annotation: patient does not respond to this medication              Review of Systems:   10 points ROS was obtained and highlighted in the HPI, otherwise negative.          Physical Exam:   /85 (BP Location: Right arm, Patient Position: Sitting, Cuff Size: Adult Large)   Pulse 92   Temp 98.2  F (36.8  C) (Oral)   Wt 99.8 kg (220 lb)   SpO2 95%   BMI 34.13 kg/m      Gen- well, NAD, A+Ox3, normal color  Lym- no palpable LAD  CVS- RRR  RS- CTA  Abd- soft, nontender. No organomegaly.   Extr- hands normal, no CONSTANTIN  Skin- no rash or jaundice  Neuro- no asterixis  Psych- normal mood           Data:   Reviewed in person and significant for:    Lab Results   Component Value Date     12/19/2022      Lab Results   Component Value Date    POTASSIUM 4.6 12/19/2022    POTASSIUM 4.0 05/19/2022     Lab Results   Component Value Date    CHLORIDE  101 12/19/2022    CHLORIDE 105 05/19/2022     Lab Results   Component Value Date    CO2 26 12/19/2022    CO2 27 05/19/2022     Lab Results   Component Value Date    BUN 11.6 12/19/2022    BUN 11 05/19/2022     Lab Results   Component Value Date    CR 0.85 12/19/2022       Lab Results   Component Value Date    WBC 5.4 05/19/2022     Lab Results   Component Value Date    HGB 13.8 05/19/2022     Lab Results   Component Value Date    HCT 42.4 05/19/2022     Lab Results   Component Value Date    MCV 89 05/19/2022     Lab Results   Component Value Date     05/19/2022       Lab Results   Component Value Date    AST 72 01/30/2023     Lab Results   Component Value Date     01/30/2023     No results found for: BILICONJ   Lab Results   Component Value Date    BILITOTAL 0.4 01/30/2023       Lab Results   Component Value Date    ALBUMIN 4.7 01/30/2023    ALBUMIN 3.8 05/19/2022     Lab Results   Component Value Date    PROTTOTAL 7.5 01/30/2023      Lab Results   Component Value Date    ALKPHOS 84 01/30/2023       Lab Results   Component Value Date    INR 0.88 05/19/2022       EXAM: CT CHEST WITHOUT IV CONTRAST     COMPARISON: None available.     FINDINGS:   Trace dependent lung atelectasis. No pleural effusion or pneumothorax. 4 mm nodule along the right   minor fissure on series 3, image 282. Heart size normal. Blood pool density suggestive of underlying   mild anemia. Mild aortic and major branch vessel calcifications. Mild coronary artery   calcifications. No thoracic adenopathy.     No mediastinal stranding or fluid collection. Esophagus within normal limits. No mediastinal gas.     Visualized portions of the upper abdomen within normal limits. Trace hiatal hernia.  Procedure Note    Richard Perez M.D. - 02/03/2023   Formatting of this note might be different from the original.   EXAM: CT CHEST WITHOUT IV CONTRAST     COMPARISON: None available.     FINDINGS:   Trace dependent lung atelectasis. No pleural  effusion or pneumothorax. 4 mm nodule along the right   minor fissure on series 3, image 282. Heart size normal. Blood pool density suggestive of underlying   mild anemia. Mild aortic and major branch vessel calcifications. Mild coronary artery   calcifications. No thoracic adenopathy.     No mediastinal stranding or fluid collection. Esophagus within normal limits. No mediastinal gas.     Visualized portions of the upper abdomen within normal limits. Trace hiatal hernia.         IMPRESSION:     1. No acute intrathoracic process.     2. Trace hiatal hernia.

## 2023-05-15 NOTE — LETTER
5/15/2023         RE: Darlene Rhodes  [address]  [place] [address]        Dear Colleague,    Thank you for referring your patient, Darlene Rhodes, to the [facility] [place]. Please see a copy of my visit note below.    Hepatology Follow-up Clinic note  Darlene Rhodes   Date of Birth 1965  Date of Service 5/14/2023    Reason for follow-up: Hepatic steatosis         Assessment/plan:   Darlene Rhodes is a 57 year old female History of elevated ALT/AST and imaging findings of hepatic steatosis. Transaminases have been historically mildly elevated. Liver function also normal without stigmata of advanced liver disease. FibroScan in May 2022 showing Stage 0-1 fibrosis, 5.2 kilopascals with Grade 2 steatosis.     # NALFD:   - Patient has only a few risk factors for on-going fibrosis (obesity, dyslipidemia). We discussed the pathophysiology and natural history of nonalcoholic fatty liver disease and cirrhosis.     - Recommend FIB-4 yearly with PCP. FIB-4 Calculation: 1.2 at 5/15/2023 11:37 AM  - Recommend slow gradual weight loss.  - Maintain good control of cholesterol (No contraindication to starting a statin with LFT elevations)   - Optimize blood glucose as needed. Could consider GLP-1 inhibitor for both insulin resistance and help with weight loss  - Improve nutrition: continue limiting carbohydrates, especially simple carbohydrates, and following Mediterranean eating patten  - Increase physical activity: maintain physical activity as able   - Limit alcohol intake to not more than 3 drinks a week    # Will also rule out overlapping genetic causes of hepatobiliary disease.   Labs: A1AT deficiency     # Consider/recommend follow up with non-invasive elastography or FibroScan in 3-5 years     # No further follow in Hepatology needed at this time      Anne Marie Bhatia PA-C   [facility] Hepatology clinic    Total time for E/M services performed on the date of the  encounter 30 minutes.  This included review of previous: clinic visits, hospital records, lab results, imaging studies, and procedural documentation. Time also includes patient visit, documentation and discussion with other providers.  The findings from this review are summarized in the above note.     -----------------------------------------------------       HPI:   Darlene Rhodes is a 57 year old female presenting for follow-up.     Dx: NAFLD  - Risk factors: dyslipidemia, obesity  FibroScan: 5/27/2022, Stage 0-1, 5.2 kilopascals Grade 2 Steatosis    Patient was last seen by Dr. Horta on 5/19/2022. In the ER in at [facility] for severe epigastric pain. Work-up was reassuring and improved with conservative treatment.     Normal appetite. Breakfast/lunch and dinner.   PB sandwich/Pork sandwich / rice    For activity, she has an eliptical machine for winter. Does Gardening in summer.     Patient denies jaundice, lower extremity edema, abdominal distension or confusion.  Patient also denies melena, hematochezia or hematemesis. Patient denies weight loss, fevers, sweats or chills. She does not drink alcohol.     Previous work-up:   Lab Results   Component Value Date    HEPBANG Nonreactive 05/19/2022    AUSAB 64.63 05/19/2022    HCVAB Nonreactive 05/19/2022    JULIETA 31 08/04/2021    SMOOTHMUS 6 05/19/2022    TSH 2.13 12/19/2022    CHOL 225 (H) 01/30/2023    HDL 56 01/30/2023     (H) 01/30/2023    TRIG 190 (H) 01/30/2023    A1C 5.5 12/19/2022      No results found for: SPECDES, LDRESULTS, LDINTERP, LDMETHOD, LDCOMMENTS, LDDISCLAIMER      Medical hx Surgical hx   Past Medical History:   Diagnosis Date     Cervical high risk HPV (human papillomavirus) test positive 04/12/2022     Gastroesophageal reflux disease with esophagitis      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)     Past Surgical History:   Procedure Laterality Date     BIOPSY  2018    endoscopy     COLONOSCOPY  2018    normal  screening     FOOT SURGERY Right     bone spur removal     GYN SURGERY  1998    ectopic pregnancy     ORTHOPEDIC SURGERY  2007    removal of bone spur on foot     [place] [facility] ECTOP PREG BY SCOPE,RMV TUBE/OVRY      Description: Laparoscopic Excision Of Ectopic Pregnancy And Salpingectomy;  Recorded: 08/31/2010;                 Medications:     Current Outpatient Medications   Medication     atorvastatin (LIPITOR) 10 MG tablet     LANsoprazole (PREVACID) 30 MG DR capsule     lidocaine, viscous, (XYLOCAINE) 2 % solution     loratadine-pseudoePHEDrine (CLARITIN-D 24-HOUR)  MG 24 hr tablet     Vitamin D (Cholecalciferol) 25 MCG (1000 UT) CAPS     No current facility-administered medications for this visit.            Allergies:     Allergies   Allergen Reactions     Animal Dander Unknown     Grass Unknown     Hydrocodone-Acetaminophen Unknown     Annotation: patient does not respond to this medication       Hydromorphone Unknown     Annotation: patient gets dizzy, headache, and does not respond to this       Hydroxyzine Unknown     Annotation: does not work    Annotation: does not work       Iodinated Contrast Media [Iodinated Contrast Media] Itching     Other       Morphine Unknown     Annotation: patient does not respond to this medication       Ondansetron Unknown     Annotation: does not work       Oxycodone-Acetaminophen Unknown     Annotation: patient does not respond to this medication              Review of Systems:   10 points ROS was obtained and highlighted in the HPI, otherwise negative.          Physical Exam:   /85 (BP Location: Right arm, Patient Position: Sitting, Cuff Size: Adult Large)   Pulse 92   Temp 98.2  F (36.8  C) (Oral)   Wt 99.8 kg (220 lb)   SpO2 95%   BMI 34.13 kg/m      Gen- well, NAD, A+Ox3, normal color  Lym- no palpable LAD  CVS- RRR  RS- CTA  Abd- soft, nontender. No organomegaly.   Extr- hands normal, no CONSTANTIN  Skin- no rash or jaundice  Neuro- no asterixis  Psych- normal  mood           Data:   Reviewed in person and significant for:    Lab Results   Component Value Date     12/19/2022      Lab Results   Component Value Date    POTASSIUM 4.6 12/19/2022    POTASSIUM 4.0 05/19/2022     Lab Results   Component Value Date    CHLORIDE 101 12/19/2022    CHLORIDE 105 05/19/2022     Lab Results   Component Value Date    CO2 26 12/19/2022    CO2 27 05/19/2022     Lab Results   Component Value Date    BUN 11.6 12/19/2022    BUN 11 05/19/2022     Lab Results   Component Value Date    CR 0.85 12/19/2022       Lab Results   Component Value Date    WBC 5.4 05/19/2022     Lab Results   Component Value Date    HGB 13.8 05/19/2022     Lab Results   Component Value Date    HCT 42.4 05/19/2022     Lab Results   Component Value Date    MCV 89 05/19/2022     Lab Results   Component Value Date     05/19/2022       Lab Results   Component Value Date    AST 72 01/30/2023     Lab Results   Component Value Date     01/30/2023     No results found for: BILICONJ   Lab Results   Component Value Date    BILITOTAL 0.4 01/30/2023       Lab Results   Component Value Date    ALBUMIN 4.7 01/30/2023    ALBUMIN 3.8 05/19/2022     Lab Results   Component Value Date    PROTTOTAL 7.5 01/30/2023      Lab Results   Component Value Date    ALKPHOS 84 01/30/2023       Lab Results   Component Value Date    INR 0.88 05/19/2022       EXAM: CT CHEST WITHOUT IV CONTRAST     COMPARISON: None available.     FINDINGS:   Trace dependent lung atelectasis. No pleural effusion or pneumothorax. 4 mm nodule along the right   minor fissure on series 3, image 282. Heart size normal. Blood pool density suggestive of underlying   mild anemia. Mild aortic and major branch vessel calcifications. Mild coronary artery   calcifications. No thoracic adenopathy.     No mediastinal stranding or fluid collection. Esophagus within normal limits. No mediastinal gas.     Visualized portions of the upper abdomen within normal limits.  Trace hiatal hernia.  Procedure Note    Richard Perez M.D. - 02/03/2023   Formatting of this note might be different from the original.   EXAM: CT CHEST WITHOUT IV CONTRAST     COMPARISON: None available.     FINDINGS:   Trace dependent lung atelectasis. No pleural effusion or pneumothorax. 4 mm nodule along the right   minor fissure on series 3, image 282. Heart size normal. Blood pool density suggestive of underlying   mild anemia. Mild aortic and major branch vessel calcifications. Mild coronary artery   calcifications. No thoracic adenopathy.     No mediastinal stranding or fluid collection. Esophagus within normal limits. No mediastinal gas.     Visualized portions of the upper abdomen within normal limits. Trace hiatal hernia.         IMPRESSION:     1. No acute intrathoracic process.     2. Trace hiatal hernia.      Again, thank you for allowing me to participate in the care of your patient.        Sincerely,        Anne Marie Bhatia PA-C

## 2023-05-17 DIAGNOSIS — J30.1 NON-SEASONAL ALLERGIC RHINITIS DUE TO POLLEN: ICD-10-CM

## 2023-05-17 LAB
BKR LAB AP GYN ADEQUACY: NORMAL
BKR LAB AP GYN INTERPRETATION: NORMAL
BKR LAB AP HPV REFLEX: NORMAL
BKR LAB AP PREVIOUS ABNL DX: NORMAL
BKR LAB AP PREVIOUS ABNORMAL: NORMAL
PATH REPORT.COMMENTS IMP SPEC: NORMAL
PATH REPORT.COMMENTS IMP SPEC: NORMAL
PATH REPORT.RELEVANT HX SPEC: NORMAL

## 2023-05-17 NOTE — CONFIDENTIAL NOTE
[employer] message sent with Gema's message below.    Thank you,  Juana MCMILLAN RN  Dermatology   [phone]

## 2023-05-17 NOTE — CONFIDENTIAL NOTE
Routing to provider for approval/denial since med not on protocol.    Thank you,  Juana MCMILLAN RN  Dermatology   [phone]

## 2023-05-17 NOTE — TELEPHONE ENCOUNTER
Requested Prescriptions   Pending Prescriptions Disp Refills     loratadine-pseudoePHEDrine (CLARITIN-D 24-HOUR)  MG 24 hr tablet 30 tablet 3     Si tab PO daily       There is no refill protocol information for this order        Last Written Prescription Date:  2022  Last Fill Quantity: 30,  # refills: 3   Last office visit: 2022 ; last virtual visit: Visit date not found with prescribing provider:  Gema Bell   Future Office Visit:      Thank you   Tyesha [facility]

## 2023-05-17 NOTE — TELEPHONE ENCOUNTER
I can not escribe this apparently. I can print out and she can  if she wants. Otherwise since I do not manage her allergies, she can get his from her PCP

## 2023-05-18 ENCOUNTER — PATIENT OUTREACH (OUTPATIENT)
Dept: FAMILY MEDICINE | Facility: CLINIC | Age: 58
End: 2023-05-18
Payer: COMMERCIAL

## 2023-05-18 LAB
HUMAN PAPILLOMA VIRUS 16 DNA: NEGATIVE
HUMAN PAPILLOMA VIRUS 18 DNA: NEGATIVE
HUMAN PAPILLOMA VIRUS FINAL DIAGNOSIS: NORMAL
HUMAN PAPILLOMA VIRUS OTHER HR: NEGATIVE

## 2023-05-22 NOTE — CONFIDENTIAL NOTE
Closing encounter as pt read [employer] message.    Thank you,  Juana MCMILLAN RN  Dermatology   [phone]

## 2023-07-19 ENCOUNTER — OFFICE VISIT (OUTPATIENT)
Dept: PODIATRY | Facility: CLINIC | Age: 58
End: 2023-07-19
Payer: COMMERCIAL

## 2023-07-19 VITALS
HEIGHT: 67 IN | BODY MASS INDEX: 32.96 KG/M2 | SYSTOLIC BLOOD PRESSURE: 126 MMHG | OXYGEN SATURATION: 97 % | HEART RATE: 92 BPM | DIASTOLIC BLOOD PRESSURE: 84 MMHG | WEIGHT: 210 LBS

## 2023-07-19 DIAGNOSIS — M21.6X1 PRONATION DEFORMITY OF BOTH FEET: Primary | ICD-10-CM

## 2023-07-19 DIAGNOSIS — M21.6X2 PRONATION DEFORMITY OF BOTH FEET: Primary | ICD-10-CM

## 2023-07-19 DIAGNOSIS — M62.40 CONTRACTED, TENDON: ICD-10-CM

## 2023-07-19 PROCEDURE — 99203 OFFICE O/P NEW LOW 30 MIN: CPT | Performed by: PODIATRIST

## 2023-07-19 NOTE — Clinical Note
"    7/19/2023         RE: Darlene Rhodes  [address] [facility] [address]        Dear Colleague,    Thank you for referring your patient, Darlene Rhodes, to the [facility]. Please see a copy of my visit note below.    FOOT AND ANKLE SURGERY/PODIATRY CONSULT NOTE         ASSESSMENT:   ***      TREATMENT:  ***        HPI: I was asked to see Darlene Rhodes today  ***.  The patient was seen in consultation at the request of *** for ***.     {PAST MEDICAL HISTORY:[id number]::\"***\"}    {SOCIAL HISTORY:[id number]::\"***\"}       Allergies   Allergen Reactions     Animal Dander Unknown     Grass Unknown     Hydrocodone-Acetaminophen Unknown     Annotation: patient does not respond to this medication       Hydromorphone Unknown     Annotation: patient gets dizzy, headache, and does not respond to this       Hydroxyzine Unknown     Annotation: does not work    Annotation: does not work       Iodinated Contrast Media [Iodinated Contrast Media] Itching     Other       Morphine Unknown     Annotation: patient does not respond to this medication       Ondansetron Unknown     Annotation: does not work       Oxycodone-Acetaminophen Unknown     Annotation: patient does not respond to this medication            Current Outpatient Medications:      atorvastatin (LIPITOR) 10 MG tablet, TAKE 1 TABLET (10 MG) BY MOUTH AT BEDTIME, Disp: 30 tablet, Rfl: 8     LANsoprazole (PREVACID) 30 MG DR capsule, [LANSOPRAZOLE (PREVACID) 30 MG CAPSULE] TAKE 1 CAPSULE (30 MG TOTAL) BY MOUTH DAILY., Disp: 90 capsule, Rfl: 3     lidocaine, viscous, (XYLOCAINE) 2 % solution, Swish and swallow 30 mLs in mouth every 3 hours as needed for moderate pain (4-6) or pain (Heartburn) ; Max 8 doses/24 hour period., Disp: 100 mL, Rfl: 3     loratadine-pseudoePHEDrine (CLARITIN-D 24-HOUR)  MG 24 hr tablet, 1 tab PO daily, Disp: 30 tablet, Rfl: 3     Vitamin D (Cholecalciferol) 25 MCG (1000 UT) CAPS, , Disp: , Rfl:  "     Family History   Problem Relation Age of Onset     No Known Problems Mother      No Known Problems Father      No Known Problems Sister      No Known Problems Sister      No Known Problems Sister      No Known Problems Maternal Grandmother      No Known Problems Maternal Grandfather      Anuerysm Paternal Grandmother         brain     Prostate Cancer Paternal Grandfather      Heart Defect Daughter         congenital septal defects     Lymphoma Daughter 25     No Known Problems Daughter      Breast Cancer No family hx of      Colon Cancer No family hx of      Diabetes No family hx of         Social History     Socioeconomic History     Marital status:      Spouse name: Not on file     Number of children: Not on file     Years of education: Not on file     Highest education level: Not on file   Occupational History     Not on file   Tobacco Use     Smoking status: Never     Passive exposure: Never     Smokeless tobacco: Never   Vaping Use     Vaping Use: Never used   Substance and Sexual Activity     Alcohol use: Yes     Comment: Alcoholic Drinks/day: 1-2 / week     Drug use: Never     Sexual activity: Yes     Partners: Male     Birth control/protection: Post-menopausal   Other Topics Concern     Parent/sibling w/ CABG, MI or angioplasty before 65F 55M? No   Social History Narrative     Not on file     Social Determinants of Health     Financial Resource Strain: Not on file   Food Insecurity: Not on file   Transportation Needs: Not on file   Physical Activity: Not on file   Stress: Not on file   Social Connections: Not on file   Intimate Partner Violence: Not on file   Housing Stability: Not on file        Review of Systems - Patient denies fever, chills, rash, wound, stiffness, limping, numbness, weakness, heart burn, blood in stool, chest pain with activity, calf pain when walking, shortness of breath with activity, chronic cough, easy bleeding/bruising, swelling of ankles, excessive thirst, fatigue,  "depression, anxiety.  Patient admits to ***.      OBJECTIVE:  Appearance: {appearance:[id number]::\"alert, well appearing, and in no distress\"}.    /84   Pulse 92   Ht 1.702 m (5' 7\")   Wt 95.3 kg (210 lb)   SpO2 97%   BMI 32.89 kg/m       Body mass index is 32.89 kg/m .     General appearance: Patient is alert and fully cooperative with history & exam.  No sign of distress is noted during the visit.  Psychiatric: Affect is pleasant & appropriate.  Patient appears motivated to improve health.  Respiratory: Breathing is regular & unlabored while sitting.  HEENT: Hearing is intact to spoken word.  Speech is clear.  No gross evidence of visual impairment that would impact ambulation.    Vascular: Dorsalis pedis and posterior tibial pulses are palpable. There is pedal hair growth ***.  CFT < 3 sec from anterior tibial surface to distal digits ***. There is no appreciable edema noted.  Dermatologic: Turgor and texture are within normal limits. No coloration or temperature changes. No primary or secondary lesions noted.  Neurologic: All epicritic and proprioceptive sensations are grossly intact ***.  Musculoskeletal: All active and passive ankle, subtalar, midtarsal, and 1st MPJ range of motion are grossly intact without pain or crepitus, with the exception of ***. Manual muscle strength is ***. All dorsiflexors, plantarflexors, invertors, evertors are intact ***. Tenderness present to *** on palpation. Tenderness to *** with range of motion. Calf is soft/non-tender with/without warmth/induration    Imaging:     {Imaging order/result:[id number]}  No images are attached to the encounter or orders placed in the encounter.     No results found.   No results found.       TREATMENT:  ***    Brennon Perry DPM  [facility] Foot & Ankle Surgery/Podiatry         Again, thank you for allowing me to participate in the care of your patient.        Sincerely,        Brennon Carpenter DPM  "

## 2023-07-19 NOTE — PATIENT INSTRUCTIONS
What are Prescription Custom Orthotics?  Custom orthotics are specially-made devices designed to support and comfort your feet. Prescription orthotics are crafted for you and no one else. They match the contours of your feet precisely and are designed for the way you move. Orthotics are only manufactured after a podiatrist has conducted a complete evaluation of your feet, ankles, and legs, so the orthotic can accommodate your unique foot structure and pathology.  Prescription orthotics are divided into two categories:  Functional orthotics are designed to control abnormal motion. They may be used to treat foot pain caused by abnormal motion; they can also be used to treat injuries such as shin splints or tendinitis. Functional orthotics are usually crafted of a semi-rigid material such as plastic or graphite.  Accommodative orthotics are softer and meant to provide additional cushioning and support. They can be used to treat diabetic foot ulcers, painful calluses on the bottom of the foot, and other uncomfortable conditions.  Podiatrists use orthotics to treat foot problems such as plantar fasciitis, bursitis, tendinitis, diabetic foot ulcers, and foot, ankle, and heel pain. Clinical research studies have shown that podiatrist-prescribed foot orthotics decrease foot pain and improve function.  Orthotics typically cost more than shoe inserts purchased in a retail store, but the additional cost is usually well worth it. Unlike shoe inserts, orthotics are molded to fit each individual foot, so you can be sure that your orthotics fit and do what they're supposed to do. Prescription orthotics are also made of top-notch materials and last many years when cared for properly. Insurance often helps pay for prescription orthotics.  What are Shoe Inserts?   You've seen them at the grocery store and at the mall. You've probably even seen them on TV and online. Shoe inserts are any kind of non-prescription foot support designed  to be worn inside a shoe. Pre-packaged, mass produced, arch supports are shoe inserts. So are the  custom-made  insoles and foot supports that you can order online or at retail stores. Unless the device has been prescribed by a doctor and crafted for your specific foot, it's a shoe insert, not a custom orthotic device--despite what the ads might say.  Shoe inserts can be very helpful for a variety of foot ailments, including flat arches and foot and leg pain. They can cushion your feet, provide comfort, and support your arches, but they can't correct biomechanical foot problems or cure long-standing foot issues.  The most common types of shoe inserts are:  Arch supports: Some people have high arches. Others have low arches or flat feet. Arch supports generally have a  bumped-up  appearance and are designed to support the foot's natural arch.   Insoles: Insoles slip into your shoe to provide extra cushioning and support. Insoles are often made of gel, foam, or plastic.   Heel liners: Heel liners, sometimes called heel pads or heel cups, provide extra cushioning in the heel region. They may be especially useful for patients who have foot pain caused by age-related thinning of the heels' natural fat pads.   Foot cushions: Do your shoes rub against your heel or your toes? Foot cushions come in many different shapes and sizes and can be used as a barrier between you and your shoe.  Choosing an Over-the-Counter Shoe Insert  Selecting a shoe insert from the wide variety of devices on the market can be overwhelming. Here are some podiatrist-tested tips to help you find the insert that best meets your needs:  Consider your health. Do you have diabetes? Problems with circulation? An over-the-counter insert may not be your best bet. Diabetes and poor circulation increase your risk of foot ulcers and infections, so schedule an appointment with a podiatrist. He or she can help you select a solution that won't cause additional  health problems.   Think about the purpose. Are you planning to run a marathon, or do you just need a little arch support in your work shoes? Look for a product that fits your planned level of activity.   Bring your shoes. For the insert to be effective, it has to fit into your shoes. So bring your sneakers, dress shoes, or work boots--whatever you plan to wear with your insert. Look for an insert that will fit the contours of your shoe.   Try them on. If all possible, slip the insert into your shoe and try it out. Walk around a little. How does it feel? Don't assume that feelings of pressure will go away with continued wear. (If you can't try the inserts at the store, ask about the store's return policy and hold on to your receipt.)    Please call one of the [place] locations below to schedule an appointment. If you received a prescription please bring it with you to your appointment. Some locations are limited to what they carry.    Office Locations    [facility]  [address] [place] [address]  Home Medical Equipment, [address]   Phone: [phone]   Orthotics and Prosthetics, [address]   Phone: [phone]    [facility]   Home Medical Equipment   [address] [address]  Phone :[phone]  Orthotics and Prosthetics   [address], [address], [facility] [address]  Phone:[phone]          [facility]  [address]   [place] [address]   Phone: [phone]    [facility]  [address]  [place] [address]  Phone: [phone]    [facility]   [address]  [place] [address]  Phone: [phone]    [facility]  [address]  [place] [address]  Phone:  [phone]    [facility]  [address]  [place] [address]  Phone: [phone]    [place]   [address] [place] [address]   Phone: [phone]    WEARING YOUR CUSTOM FOOT ORTHOTICS   Most insurance plans cover one pair of orthotics per year. You must check with your   insurance plan to see what your payment responsibility will be. Please call your   insurance company by calling the number on the back of your insurance card.   Orthotic's are non-refundable and non-returnable.   Orthotics are made of various designs. Some orthotics are covered with material that extends beyond your toes. If your orthotic is of this design, you will likely need to trim the toe end to get a proper fit. The insole from your shoe can be used as a template. Simply overlay the shoe insert on top of the custom orthotic. Align the heel end while tracing the length of the insert onto the custom orthotic. Use a large scissor to trim the toe end until you get a proper fit in the shoe.   The orthotic needs to be pushed as far back in the shoe as possible. The heel portion should not ride forward so as not to irritate your heel.   Orthotics are designed to work with socks. Excessive perspiration will shorten the life span of the orthotics. Remove the orthotic from the shoe frequently for proper drying.   The break-in period lasts for weeks. People new to orthotics will likely experience new aches and pains. The orthotic is forcing your foot into a new position. Arch, foot and leg muscle aches and fatigue are common during these weeks. Minor discomfort can be considered normal break in phenomenon. Start wearing your orthotic around your home your first day. Limited activity for one to two hours is recommended. You can increase one or two additional hours each day provided the aches and pains are subsiding. The degree of discomfort, fatigue and problems will dictate the speed of break  in. You may require multiple weeks to work up to full time use.   Do not continue wearing your orthotics if they are creating problems such as blisters or sores. Do not hesitate to call the clinic to speak with a nurse regarding orthotic   break in, fit, trimming, etc. You may also need to see the doctor if the orthotics are   simply not working out. Adjustments are sometimes made to improve orthotic   function.     Orthotics will only work in certain styles and types of shoes. Orthotics rarely work in dress shoes. Slip-ons, clogs, sandals and heels are particularly troublesome. Specially designed orthotics may be necessary for these types of shoes. Your custom orthotic was designed for activities that require appropriate walking or running shoes. Lace up athletic shoes, walking shoes or work boots should work appropriately. You may need a wider or longer shoe. Shoes with a removable  or insert work best. In general, you want to remove an insert from the shoe before placing the orthotic into the shoe. Shoes without a removable liner may not work as well.     When purchasing new shoes, bring your orthotics along to get a proper fit. Shop at stores that are familiar with orthotics.   Frequent washing of the orthotic may shorten the life span of the top cover. The top cover can be replaced but will generally last one to five years depending on use and foot perspiration.

## 2023-07-19 NOTE — PROGRESS NOTES
FOOT AND ANKLE SURGERY/PODIATRY CONSULT NOTE         ASSESSMENT:   Pronation deformity  Contracted Achilles tendon      TREATMENT:  I recommended orthotics.  The patient is to return to the clinic as needed.        HPI: Darlene Rhodes presented to the clinic today complaining of a mild to moderate foot pain bilaterally.  The patient indicated that she has had a history of low back pain and joint pain both lower extremities.  She at 1 time did wear orthotics.  She has not had a pair of orthotics for several years.  She feels as though some of the discomfort she has in her lower extremity is due to her malalignment of her lower extremities.  She was extremely active.  She was training for marathon and developed a stress fracture of her right femur.  She discontinued exercising because of the injury.  She would like to start exercising once again.  She feels as though she can mechanically control her feet it would help her to return to normal physical activities without injury.    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)        Social History     Socioeconomic History     Marital status:      Spouse name: Not on file     Number of children: Not on file     Years of education: Not on file     Highest education level: Not on file   Occupational History     Not on file   Tobacco Use     Smoking status: Never     Passive exposure: Never     Smokeless tobacco: Never   Vaping Use     Vaping Use: Never used   Substance and Sexual Activity     Alcohol use: Yes     Comment: Alcoholic Drinks/day: 1-2 / week     Drug use: Never     Sexual activity: Yes     Partners: Male     Birth control/protection: Post-menopausal   Other Topics Concern     Parent/sibling w/ CABG, MI or angioplasty before 65F 55M? No   Social  History Narrative     Not on file     Social Determinants of Health     Financial Resource Strain: Not on file   Food Insecurity: Not on file   Transportation Needs: Not on file   Physical Activity: Not on file   Stress: Not on file   Social Connections: Not on file   Intimate Partner Violence: Not on file   Housing Stability: Not on file          Allergies   Allergen Reactions     Animal Dander Unknown     Grass Unknown     Hydrocodone-Acetaminophen Unknown     Annotation: patient does not respond to this medication       Hydromorphone Unknown     Annotation: patient gets dizzy, headache, and does not respond to this       Hydroxyzine Unknown     Annotation: does not work    Annotation: does not work       Iodinated Contrast Media [Iodinated Contrast Media] Itching     Other       Morphine Unknown     Annotation: patient does not respond to this medication       Ondansetron Unknown     Annotation: does not work       Oxycodone-Acetaminophen Unknown     Annotation: patient does not respond to this medication            Current Outpatient Medications:      atorvastatin (LIPITOR) 10 MG tablet, TAKE 1 TABLET (10 MG) BY MOUTH AT BEDTIME, Disp: 30 tablet, Rfl: 8     LANsoprazole (PREVACID) 30 MG DR capsule, [LANSOPRAZOLE (PREVACID) 30 MG CAPSULE] TAKE 1 CAPSULE (30 MG TOTAL) BY MOUTH DAILY., Disp: 90 capsule, Rfl: 3     lidocaine, viscous, (XYLOCAINE) 2 % solution, Swish and swallow 30 mLs in mouth every 3 hours as needed for moderate pain (4-6) or pain (Heartburn) ; Max 8 doses/24 hour period., Disp: 100 mL, Rfl: 3     loratadine-pseudoePHEDrine (CLARITIN-D 24-HOUR)  MG 24 hr tablet, 1 tab PO daily, Disp: 30 tablet, Rfl: 3     Vitamin D (Cholecalciferol) 25 MCG (1000 UT) CAPS, , Disp: , Rfl:      Family History   Problem Relation Age of Onset     No Known Problems Mother      No Known Problems Father      No Known Problems Sister      No Known Problems Sister      No Known Problems Sister      No Known Problems  "Maternal Grandmother      No Known Problems Maternal Grandfather      Anuerysm Paternal Grandmother         brain     Prostate Cancer Paternal Grandfather      Heart Defect Daughter         congenital septal defects     Lymphoma Daughter 25     No Known Problems Daughter      Breast Cancer No family hx of      Colon Cancer No family hx of      Diabetes No family hx of         Social History     Socioeconomic History     Marital status:      Spouse name: Not on file     Number of children: Not on file     Years of education: Not on file     Highest education level: Not on file   Occupational History     Not on file   Tobacco Use     Smoking status: Never     Passive exposure: Never     Smokeless tobacco: Never   Vaping Use     Vaping Use: Never used   Substance and Sexual Activity     Alcohol use: Yes     Comment: Alcoholic Drinks/day: 1-2 / week     Drug use: Never     Sexual activity: Yes     Partners: Male     Birth control/protection: Post-menopausal   Other Topics Concern     Parent/sibling w/ CABG, MI or angioplasty before 65F 55M? No   Social History Narrative     Not on file     Social Determinants of Health     Financial Resource Strain: Not on file   Food Insecurity: Not on file   Transportation Needs: Not on file   Physical Activity: Not on file   Stress: Not on file   Social Connections: Not on file   Intimate Partner Violence: Not on file   Housing Stability: Not on file        Review of Systems - Patient denies fever, chills, rash, wound, stiffness, limping, numbness, weakness, heart burn, blood in stool, chest pain with activity, calf pain when walking, shortness of breath with activity, chronic cough, easy bleeding/bruising, swelling of ankles, excessive thirst, fatigue, depression, anxiety.  Patient admits to mild bilateral foot pain.      OBJECTIVE:  Appearance: alert, well appearing, and in no distress.    /84   Pulse 92   Ht 1.702 m (5' 7\")   Wt 95.3 kg (210 lb)   SpO2 97%   BMI " 32.89 kg/m       Body mass index is 32.89 kg/m .     General appearance: Patient is alert and fully cooperative with history & exam.  No sign of distress is noted during the visit.  Psychiatric: Affect is pleasant & appropriate.  Patient appears motivated to improve health.  Respiratory: Breathing is regular & unlabored while sitting.  HEENT: Hearing is intact to spoken word.  Speech is clear.  No gross evidence of visual impairment that would impact ambulation.    Vascular: Dorsalis pedis and posterior tibial pulses are palpable. There is no pedal hair growth bilaterally.  CFT < 3 sec from anterior tibial surface to distal digits bilaterally. There is no appreciable edema noted.  Dermatologic: Turgor and texture are within normal limits. No coloration or temperature changes. No primary or secondary lesions noted.  Neurologic: All epicritic and proprioceptive sensations are grossly intact bilaterally.  Musculoskeletal: All active and passive ankle, subtalar, midtarsal, and 1st MPJ range of motion are grossly intact without pain or crepitus, with the exception of none. Manual muscle strength is within normal limits bilaterally. All dorsiflexors, plantarflexors, invertors, evertors are intact bilaterally.  No tenderness present to bilateral feet or ankles on palpation.  No tenderness to bilateral feet or ankles with range of motion.  Flattening of the medial longitudinal arch noted bilaterally.  Decreased range of motion available at the ankle joint when the feet are maximally dorsiflexed with the legs are extended.  Calf is soft/non-tender without warmth/induration    Imaging:       No images are attached to the encounter or orders placed in the encounter.     No results found.   No results found.           Brennon Perry DPM  [facility] Foot & Ankle Surgery/Podiatry

## 2023-07-24 NOTE — TELEPHONE ENCOUNTER
DIAGNOSIS: JO knee and hip pain   APPOINTMENT DATE: 07/27/2023   NOTES STATUS DETAILS   OFFICE NOTE from other specialist In process    MEDICATION LIST Internal    IMAGES In process

## 2023-07-25 NOTE — TELEPHONE ENCOUNTER
DIAGNOSIS: JO knee and hip pain/medica/ortho con   APPOINTMENT DATE: 07/27/23   NOTES STATUS DETAILS   OFFICE NOTE from referring provider Self Self   MEDICATION LIST Internal    MRI Internal 04/01/11,10/03/06 - [place]:  - MR Pelvis W/O Contrast

## 2023-07-27 ENCOUNTER — PRE VISIT (OUTPATIENT)
Dept: ORTHOPEDICS | Facility: CLINIC | Age: 58
End: 2023-07-27

## 2023-07-27 ENCOUNTER — ANCILLARY PROCEDURE (OUTPATIENT)
Dept: GENERAL RADIOLOGY | Facility: CLINIC | Age: 58
End: 2023-07-27
Attending: FAMILY MEDICINE
Payer: COMMERCIAL

## 2023-07-27 ENCOUNTER — OFFICE VISIT (OUTPATIENT)
Dept: ORTHOPEDICS | Facility: CLINIC | Age: 58
End: 2023-07-27
Payer: COMMERCIAL

## 2023-07-27 DIAGNOSIS — M22.2X2 PATELLOFEMORAL ARTHRALGIA OF BOTH KNEES: Primary | ICD-10-CM

## 2023-07-27 DIAGNOSIS — M76.30 TENDINITIS OF ILIOTIBIAL BAND, UNSPECIFIED LATERALITY: ICD-10-CM

## 2023-07-27 DIAGNOSIS — M22.2X1 PATELLOFEMORAL ARTHRALGIA OF BOTH KNEES: Primary | ICD-10-CM

## 2023-07-27 PROCEDURE — 73562 X-RAY EXAM OF KNEE 3: CPT | Mod: LT | Performed by: RADIOLOGY

## 2023-07-27 PROCEDURE — 99203 OFFICE O/P NEW LOW 30 MIN: CPT | Performed by: FAMILY MEDICINE

## 2023-07-27 NOTE — PROGRESS NOTES
This 57-year-old female who was recently doing lots of walking in [facility].  She could notice discomfort on the lateral aspect of both hips, to the lateral aspect of both knees, in the area of the IT bands.  She also noticed aching discomfort in the anterior kneecaps at the end of the days walking.    She recently saw podiatry and got a set of inserts for her shoes.  She is looking at returning to athletic activity.  She has been a runner in the distant past, but did have a history of a stress fracture associated with running about a decade ago, so she has decided that she does not want to be a runner.  She is instead wanting to focus on rowing machine, elliptical , yoga.  She is recently retired and her grown children have recently left the household.  She does not spend time at a desk or computer.            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)        Active problem list:  Patient Active Problem List   Diagnosis    CYP2D6 poor metabolizer (H)    Ulcer of esophagus without bleeding    HH (hiatus hernia)    Gastroesophageal reflux disease without esophagitis    Multiple gastric polyps    Cervical high risk HPV (human papillomavirus) test positive    Class 1 obesity with serious comorbidity and body mass index (BMI) of 34.0 to 34.9 in adult    NAFLD (nonalcoholic fatty liver disease)    Vitamin D deficiency    Other acquired deformity of toe    Nontoxic uninodular goiter    Hyperlipidemia       FH:  Family History   Problem Relation Age of Onset    No Known Problems Mother     No Known Problems Father     No Known Problems Sister     No Known Problems Sister     No Known Problems Sister     No Known Problems Maternal Grandmother     No Known Problems Maternal Grandfather     Anuerysm Paternal  Grandmother         brain    Prostate Cancer Paternal Grandfather     Heart Defect Daughter         congenital septal defects    Lymphoma Daughter 25    No Known Problems Daughter     Breast Cancer No family hx of     Colon Cancer No family hx of     Diabetes No family hx of        SH:  Social History     Socioeconomic History    Marital status:      Spouse name: Not on file    Number of children: Not on file    Years of education: Not on file    Highest education level: Not on file   Occupational History    Not on file   Tobacco Use    Smoking status: Never     Passive exposure: Never    Smokeless tobacco: Never   Vaping Use    Vaping Use: Never used   Substance and Sexual Activity    Alcohol use: Yes     Comment: Alcoholic Drinks/day: 1-2 / week    Drug use: Never    Sexual activity: Yes     Partners: Male     Birth control/protection: Post-menopausal   Other Topics Concern    Parent/sibling w/ CABG, MI or angioplasty before 65F 55M? No   Social History Narrative    Not on file     Social Determinants of Health     Financial Resource Strain: Not on file   Food Insecurity: Not on file   Transportation Needs: Not on file   Physical Activity: Not on file   Stress: Not on file   Social Connections: Not on file   Intimate Partner Violence: Not on file   Housing Stability: Not on file       MEDS:  See EMR, reviewed  ALL:  See EMR, reviewed    REVIEW OF SYSTEMS:  CONSTITUTIONAL:NEGATIVE for fever, chills, change in weight  INTEGUMENTARY/SKIN: NEGATIVE for worrisome rashes, moles or lesions  EYES: NEGATIVE for vision changes or irritation  ENT/MOUTH: NEGATIVE for ear, mouth and throat problems  RESP:NEGATIVE for significant cough or SOB  BREAST: NEGATIVE for masses, tenderness or discharge  CV: NEGATIVE for chest pain, palpitations or peripheral edema  GI: NEGATIVE for nausea, abdominal pain, heartburn, or change in bowel habits  :NEGATIVE for frequency, dysuria, or hematuria  :NEGATIVE for frequency, dysuria,  or hematuria  NEURO: NEGATIVE for weakness, dizziness or paresthesias  ENDOCRINE: NEGATIVE for temperature intolerance, skin/hair changes  HEME/ALLERGY/IMMUNE: NEGATIVE for bleeding problems  PSYCHIATRIC: NEGATIVE for changes in mood or affect    Objective: She has normal range of motion of the bilateral hips to internal rotation external rotation and abduction.  Figure 4 position to the table as tolerated.  FADIR test is negative.  The bilateral knees reveal no effusion.  Nontender over the medial lateral patellar facets.  Nontender over the patellar tendon or pes anserine bursa bilaterally.  Nontender over the medial or lateral joint lines bilateral.  I can flex and extend both knees fully.  Anterior posterior drawer is negative to the bilateral knees.  Today she is nontender over the area of the greater trochanter or the distal IT band, left she indicates that she was tender in these areas in the past.  She has improvements to be made in abdominal bridging strength and in 1 and 2 legged squat form.  Sensation is intact distally.  Appropriate conversation and affect.    I personally viewed with the patient x-rays of the bilateral knees that show no significant degenerative joint changes or bony abnormality.  Subtle spurring in the patellofemoral space only.    Assessment: IT band tendinitis, patellofemoral arthralgia    Plan: We discussed exercises that she can consider including elliptiical , rowing machine, yoga, Pilates.  She would like to see physical therapy for instruction on proper squat form, proper lunge form, as well as pelvic femoral alignment.  She would like to see Molly at the [facility] [place], [place] on the fifth floor.  She had no further questions and will follow-up as needed.

## 2023-07-27 NOTE — LETTER
7/27/2023      RE: Darlene Rhodes  [address]  [place] [address]     Dear Colleague,    Thank you for referring your patient, Darlene Rhodes, to the [facility] [place]. Please see a copy of my visit note below.    This 57-year-old female who was recently doing lots of walking in [facility].  She could notice discomfort on the lateral aspect of both hips, to the lateral aspect of both knees, in the area of the IT bands.  She also noticed aching discomfort in the anterior kneecaps at the end of the days walking.    She recently saw podiatry and got a set of inserts for her shoes.  She is looking at returning to athletic activity.  She has been a runner in the distant past, but did have a history of a stress fracture associated with running about a decade ago, so she has decided that she does not want to be a runner.  She is instead wanting to focus on rowing machine, elliptical , yoga.  She is recently retired and her grown children have recently left the household.  She does not spend time at a desk or computer.            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)        Active problem list:  Patient Active Problem List   Diagnosis     CYP2D6 poor metabolizer (H)     Ulcer of esophagus without bleeding     HH (hiatus hernia)     Gastroesophageal reflux disease without esophagitis     Multiple gastric polyps     Cervical high risk HPV (human papillomavirus) test positive     Class 1 obesity with serious comorbidity and body mass index (BMI) of 34.0 to 34.9 in adult     NAFLD (nonalcoholic fatty liver disease)     Vitamin D deficiency     Other acquired deformity of toe     Nontoxic uninodular goiter     Hyperlipidemia       FH:  Family History   Problem  Relation Age of Onset     No Known Problems Mother      No Known Problems Father      No Known Problems Sister      No Known Problems Sister      No Known Problems Sister      No Known Problems Maternal Grandmother      No Known Problems Maternal Grandfather      Anuerysm Paternal Grandmother         brain     Prostate Cancer Paternal Grandfather      Heart Defect Daughter         congenital septal defects     Lymphoma Daughter 25     No Known Problems Daughter      Breast Cancer No family hx of      Colon Cancer No family hx of      Diabetes No family hx of        SH:  Social History     Socioeconomic History     Marital status:      Spouse name: Not on file     Number of children: Not on file     Years of education: Not on file     Highest education level: Not on file   Occupational History     Not on file   Tobacco Use     Smoking status: Never     Passive exposure: Never     Smokeless tobacco: Never   Vaping Use     Vaping Use: Never used   Substance and Sexual Activity     Alcohol use: Yes     Comment: Alcoholic Drinks/day: 1-2 / week     Drug use: Never     Sexual activity: Yes     Partners: Male     Birth control/protection: Post-menopausal   Other Topics Concern     Parent/sibling w/ CABG, MI or angioplasty before 65F 55M? No   Social History Narrative     Not on file     Social Determinants of Health     Financial Resource Strain: Not on file   Food Insecurity: Not on file   Transportation Needs: Not on file   Physical Activity: Not on file   Stress: Not on file   Social Connections: Not on file   Intimate Partner Violence: Not on file   Housing Stability: Not on file       MEDS:  See EMR, reviewed  ALL:  See EMR, reviewed    REVIEW OF SYSTEMS:  CONSTITUTIONAL:NEGATIVE for fever, chills, change in weight  INTEGUMENTARY/SKIN: NEGATIVE for worrisome rashes, moles or lesions  EYES: NEGATIVE for vision changes or irritation  ENT/MOUTH: NEGATIVE for ear, mouth and throat problems  RESP:NEGATIVE for  significant cough or SOB  BREAST: NEGATIVE for masses, tenderness or discharge  CV: NEGATIVE for chest pain, palpitations or peripheral edema  GI: NEGATIVE for nausea, abdominal pain, heartburn, or change in bowel habits  :NEGATIVE for frequency, dysuria, or hematuria  :NEGATIVE for frequency, dysuria, or hematuria  NEURO: NEGATIVE for weakness, dizziness or paresthesias  ENDOCRINE: NEGATIVE for temperature intolerance, skin/hair changes  HEME/ALLERGY/IMMUNE: NEGATIVE for bleeding problems  PSYCHIATRIC: NEGATIVE for changes in mood or affect    Objective: She has normal range of motion of the bilateral hips to internal rotation external rotation and abduction.  Figure 4 position to the table as tolerated.  FADIR test is negative.  The bilateral knees reveal no effusion.  Nontender over the medial lateral patellar facets.  Nontender over the patellar tendon or pes anserine bursa bilaterally.  Nontender over the medial or lateral joint lines bilateral.  I can flex and extend both knees fully.  Anterior posterior drawer is negative to the bilateral knees.  Today she is nontender over the area of the greater trochanter or the distal IT band, left she indicates that she was tender in these areas in the past.  She has improvements to be made in abdominal bridging strength and in 1 and 2 legged squat form.  Sensation is intact distally.  Appropriate conversation and affect.    I personally viewed with the patient x-rays of the bilateral knees that show no significant degenerative joint changes or bony abnormality.  Subtle spurring in the patellofemoral space only.    Assessment: IT band tendinitis, patellofemoral arthralgia    Plan: We discussed exercises that she can consider including elliptiical , rowing machine, yoga, Pilates.  She would like to see physical therapy for instruction on proper squat form, proper lunge form, as well as pelvic femoral alignment.  [place] [facility] [place].  She had no further questions and will follow-up as needed.                          Again, thank you for allowing me to participate in the care of your patient.      Sincerely,    Devang Suero MD

## 2023-08-31 ASSESSMENT — ACTIVITIES OF DAILY LIVING (ADL)
PERFORMING_HEAVY_ACTIVITIES_AROUND_YOUR_HOME: MODERATE DIFFICULTY
SPORTS_COUNT: 9
PUTTING ON SOCKS AND SHOES: SLIGHT DIFFICULTY
GOING_UP_OR_DOWN_10_STAIRS: A LITTLE BIT OF DIFFICULTY
PUTTING_ON_YOUR_SHOES_OR_SOCKS: A LITTLE BIT OF DIFFICULTY
STIFFNESS: THE SYMPTOM AFFECTS MY ACTIVITY SLIGHTLY
ABILITY_TO_PARTICIPATE_IN_YOUR_DESIRED_SPORT_AS_LONG_AS_YOU_WOULD_LIKE: SLIGHT DIFFICULTY
GETTING_INTO_AND_OUT_OF_A_BATHTUB: SLIGHT DIFFICULTY
HEAVY_WORK: MODERATE DIFFICULTY
AS_A_RESULT_OF_YOUR_KNEE_INJURY,_HOW_WOULD_YOU_RATE_YOUR_CURRENT_LEVEL_OF_DAILY_ACTIVITY?: NEARLY NORMAL
MAKING_SHARP_TURNS_WHILE_RUNNING_FAST: QUITE A BIT OF DIFFICULTY
HOW_WOULD_YOU_RATE_THE_OVERALL_FUNCTION_OF_YOUR_KNEE_DURING_YOUR_USUAL_DAILY_ACTIVITIES?: NEARLY NORMAL
PLEASE_INDICATE_YOR_PRIMARY_REASON_FOR_REFERRAL_TO_THERAPY:: HIP
KNEE_ACTIVITY_OF_DAILY_LIVING_SCORE: 68.57
SIT WITH YOUR KNEE BENT: ACTIVITY IS NOT DIFFICULT
RUNNING_ON_EVEN_GROUND: QUITE A BIT OF DIFFICULTY
GIVING WAY, BUCKLING OR SHIFTING OF KNEE: THE SYMPTOM AFFECTS MY ACTIVITY SLIGHTLY
PAIN: THE SYMPTOM AFFECTS MY ACTIVITY SLIGHTLY
CUTTING/LATERAL_MOVEMENTS: SLIGHT DIFFICULTY
SITTING_FOR_1_HOUR: A LITTLE BIT OF DIFFICULTY
WALKING_INITIALLY: SLIGHT DIFFICULTY
STEPPING_UP_AND_DOWN_CURBS: NO DIFFICULTY AT ALL
WALKING_DOWN_STEEP_HILLS: SLIGHT DIFFICULTY
GOING_UP_1_FLIGHT_OF_STAIRS: SLIGHT DIFFICULTY
JUMPING: EXTREME DIFFICULTY
PLEASE_INDICATE_YOR_PRIMARY_REASON_FOR_REFERRAL_TO_THERAPY:: FOOT AND/OR ANKLE
RISE FROM A CHAIR: ACTIVITY IS MINIMALLY DIFFICULT
WALKING_APPROXIMATELY_10_MINUTES: SLIGHT DIFFICULTY
SWELLING: THE SYMPTOM AFFECTS MY ACTIVITY SLIGHTLY
HOS_ADL_ITEM_SCORE_TOTAL: 49
HOW_WOULD_YOU_RATE_YOUR_CURRENT_LEVEL_OF_FUNCTION?: NEARLY NORMAL
GETTING_INTO_AND_OUT_OF_AN_AVERAGE_CAR: SLIGHT DIFFICULTY
STANDING FOR 15 MINUTES: SLIGHT DIFFICULTY
YOUR_USUAL_HOBBIES,_RECREATIONAL_OR_SPORTING_ACTIVITIES: MODERATE DIFFICULTY
HOW_WOULD_YOU_RATE_YOUR_CURRENT_LEVEL_OF_FUNCTION_DURING_YOUR_USUAL_ACTIVITIES_OF_DAILY_LIVING_FROM_0_TO_100_WITH_100_BEING_YOUR_LEVEL_OF_FUNCTION_PRIOR_TO_YOUR_HIP_PROBLEM_AND_0_BEING_THE_INABILITY_TO_PERFORM_ANY_OF_YOUR_USUAL_DAILY_ACTIVITIES?: 75
STAND: ACTIVITY IS MINIMALLY DIFFICULT
GETTING_INTO_AND_OUT_OF_A_BATHTUB: SLIGHT DIFFICULTY
SWELLING: THE SYMPTOM AFFECTS MY ACTIVITY SLIGHTLY
LOW_IMPACT_ACTIVITIES_LIKE_FAST_WALKING: SLIGHT DIFFICULTY
HOW_WOULD_YOU_RATE_THE_CURRENT_FUNCTION_OF_YOUR_KNEE_DURING_YOUR_USUAL_DAILY_ACTIVITIES_ON_A_SCALE_FROM_0_TO_100_WITH_100_BEING_YOUR_LEVEL_OF_KNEE_FUNCTION_PRIOR_TO_YOUR_INJURY_AND_0_BEING_THE_INABILITY_TO_PERFORM_ANY_OF_YOUR_USUAL_DAILY_ACTIVITIES?: 75
WALKING_UP_STEEP_HILLS: SLIGHT DIFFICULTY
KNEEL ON THE FRONT OF YOUR KNEE: ACTIVITY IS SOMEWHAT DIFFICULT
LEFS_RAW_SCORE: 0
GETTING INTO AND OUT OF AN AVERAGE CAR: SLIGHT DIFFICULTY
WEAKNESS: THE SYMPTOM AFFECTS MY ACTIVITY MODERATELY
ADL_SCORE(%): 0
GIVING WAY, BUCKLING OR SHIFTING OF KNEE: THE SYMPTOM AFFECTS MY ACTIVITY SLIGHTLY
TWISTING/PIVOTING ON INVOLVED LEG: SLIGHT DIFFICULTY
SPORTS_SCORE(%): 0
ADL_TOTAL_ITEM_SCORE: 0
WALKING_2_BLOCKS: A LITTLE BIT OF DIFFICULTY
HOW_WOULD_YOU_RATE_YOUR_CURRENT_LEVEL_OF_FUNCTION_DURING_YOUR_SPORTS_RELATED_ACTIVITIES_FROM_0_TO_100_WITH_100_BEING_YOUR_LEVEL_OF_FUNCTION_PRIOR_TO_YOUR_HIP_PROBLEM_AND_0_BEING_THE_INABILITY_TO_PERFORM_ANY_OF_YOUR_USUAL_DAILY_ACTIVITIES?: 75
LIFTING_AN_OBJECT,_LIKE_A_BAG_OF_GROCERIES_FROM_THE_FLOOR: A LITTLE BIT OF DIFFICULTY
SQUAT: ACTIVITY IS SOMEWHAT DIFFICULT
WALKING_BETWEEN_ROOMS: NO DIFFICULTY
HOS_ADL_SCORE(%): 72.06
ROLLING_OVER_IN_BED: A LITTLE BIT OF DIFFICULTY
WALKING_INITIALLY: SLIGHT DIFFICULTY
GO UP STAIRS: ACTIVITY IS SOMEWHAT DIFFICULT
KNEE_ACTIVITY_OF_DAILY_LIVING_SUM: 48
HOS_ADL_HIGHEST_POTENTIAL_SCORE: 68
ADL_COUNT: 17
STIFFNESS: THE SYMPTOM AFFECTS MY ACTIVITY SLIGHTLY
WALKING_UP_STEEP_HILLS: SLIGHT DIFFICULTY
STAND: ACTIVITY IS MINIMALLY DIFFICULT
WALKING_15_MINUTES_OR_GREATER: SLIGHT DIFFICULTY
RUNNING_ON_UNEVEN_GROUND: EXTREME DIFFICULTY OR UNABLE TO PERFORM ACTIVITY
HOW_WOULD_YOU_RATE_YOUR_CURRENT_LEVEL_OF_FUNCTION_DURING_YOUR_USUAL_ACTIVITIES_OF_DAILY_LIVING_FROM_0_TO_100_WITH_100_BEING_YOUR_LEVEL_OF_FUNCTION_PRIOR_TO_YOUR_HIP_PROBLEM_AND_0_BEING_THE_INABILITY_TO_PERFORM_ANY_OF_YOUR_USUAL_DAILY_ACTIVITIES?: 75
GO UP STAIRS: ACTIVITY IS SOMEWHAT DIFFICULT
GETTING_INTO_OR_OUT_OF_A_CAR: A LITTLE BIT OF DIFFICULTY
SITTING FOR 15 MINUTES: NO DIFFICULTY AT ALL
GO DOWN STAIRS: ACTIVITY IS SOMEWHAT DIFFICULT
RECREATIONAL ACTIVITIES: MODERATE DIFFICULTY
LIMPING: I DO NOT HAVE THE SYMPTOM
WALKING_FOR_APPROXIMATELY_10_MINUTES: SLIGHT DIFFICULTY
ROLLING_OVER_IN_BED: SLIGHT DIFFICULTY
PAIN: THE SYMPTOM AFFECTS MY ACTIVITY SLIGHTLY
DEEP SQUATTING: MODERATE DIFFICULTY
HEAVY_WORK: MODERATE DIFFICULTY
ABILITY_TO_PERFORM_ACTIVITY_WITH_YOUR_NORMAL_TECHNIQUE: SLIGHT DIFFICULTY
PUTTING_ON_SOCKS_AND_SHOES: SLIGHT DIFFICULTY
LIMPING: I DO NOT HAVE THE SYMPTOM
WALKING_15_MINUTES_OR_GREATER: SLIGHT DIFFICULTY
LIGHT_TO_MODERATE_WORK: SLIGHT DIFFICULTY
WALKING_DOWN_STEEP_HILLS: SLIGHT DIFFICULTY
DEEP_SQUATTING: MODERATE DIFFICULTY
PLEASE_INDICATE_YOR_PRIMARY_REASON_FOR_REFERRAL_TO_THERAPY:: KNEE
LEFS_SCORE(%): 0
STARTING_AND_STOPPING_QUICKLY: SLIGHT DIFFICULTY
WALK: ACTIVITY IS MINIMALLY DIFFICULT
AS_A_RESULT_OF_YOUR_KNEE_INJURY,_HOW_WOULD_YOU_RATE_YOUR_CURRENT_LEVEL_OF_DAILY_ACTIVITY?: NEARLY NORMAL
SPORTS_HIGHEST_POTENTIAL_SCORE: 36
RECREATIONAL_ACTIVITIES: MODERATE DIFFICULTY
STANDING_FOR_1_HOUR: QUITE A BIT OF DIFFICULTY
GO DOWN STAIRS: ACTIVITY IS SOMEWHAT DIFFICULT
KNEEL ON THE FRONT OF YOUR KNEE: ACTIVITY IS SOMEWHAT DIFFICULT
ANY_OF_YOUR_USUAL_WORK,_HOUSEWORK_OR_SCHOOL_ACTIVITIES: A LITTLE BIT OF DIFFICULTY
GOING_DOWN_1_FLIGHT_OF_STAIRS: SLIGHT DIFFICULTY
HOW_WOULD_YOU_RATE_THE_OVERALL_FUNCTION_OF_YOUR_KNEE_DURING_YOUR_USUAL_DAILY_ACTIVITIES?: NEARLY NORMAL
SPORTS_TOTAL_ITEM_SCORE: 0
GOING UP 1 FLIGHT OF STAIRS: SLIGHT DIFFICULTY
TWISTING/PIVOTING_ON_INVOLVED_LEG: SLIGHT DIFFICULTY
GETTING_INTO_AND_OUT_OF_A_BATH: A LITTLE BIT OF DIFFICULTY
ROLLING OVER IN BED: SLIGHT DIFFICULTY
GOING DOWN 1 FLIGHT OF STAIRS: SLIGHT DIFFICULTY
SQUAT: ACTIVITY IS SOMEWHAT DIFFICULT
RISE FROM A CHAIR: ACTIVITY IS MINIMALLY DIFFICULT
RAW_SCORE: 48
SHOPPING: QUITE A BIT OF DIFFICULTY
WEAKNESS: THE SYMPTOM AFFECTS MY ACTIVITY MODERATELY
STANDING_FOR_15_MINUTES: SLIGHT DIFFICULTY
ADL_HIGHEST_POTENTIAL_SCORE: 68
RUNNING_ONE_MILE: UNABLE TO DO
STEPPING UP AND DOWN CURBS: NO DIFFICULTY AT ALL
SQUATTING: MODERATE DIFFICULTY
PERFORMING_LIGHT_ACTIVITIES_AROUND_YOUR_HOME: A LITTLE BIT OF DIFFICULTY
HOW_WOULD_YOU_RATE_THE_CURRENT_FUNCTION_OF_YOUR_KNEE_DURING_YOUR_USUAL_DAILY_ACTIVITIES_ON_A_SCALE_FROM_0_TO_100_WITH_100_BEING_YOUR_LEVEL_OF_KNEE_FUNCTION_PRIOR_TO_YOUR_INJURY_AND_0_BEING_THE_INABILITY_TO_PERFORM_ANY_OF_YOUR_USUAL_DAILY_ACTIVITIES?: 75
WALK: ACTIVITY IS MINIMALLY DIFFICULT
SIT WITH YOUR KNEE BENT: ACTIVITY IS NOT DIFFICULT
SITTING_FOR_15_MINUTES: NO DIFFICULTY AT ALL
WALKING_A_MILE: MODERATE DIFFICULTY
LIGHT_TO_MODERATE_WORK: SLIGHT DIFFICULTY

## 2023-09-01 ENCOUNTER — THERAPY VISIT (OUTPATIENT)
Dept: PHYSICAL THERAPY | Facility: CLINIC | Age: 58
End: 2023-09-01
Payer: COMMERCIAL

## 2023-09-01 DIAGNOSIS — M25.551 HIP PAIN, RIGHT: ICD-10-CM

## 2023-09-01 DIAGNOSIS — M25.561 KNEE PAIN, RIGHT: Primary | ICD-10-CM

## 2023-09-01 PROCEDURE — 97110 THERAPEUTIC EXERCISES: CPT | Mod: GP | Performed by: PHYSICAL THERAPIST

## 2023-09-01 PROCEDURE — 97161 PT EVAL LOW COMPLEX 20 MIN: CPT | Mod: GP | Performed by: PHYSICAL THERAPIST

## 2023-09-01 NOTE — PROGRESS NOTES
PHYSICAL THERAPY EVALUATION  Type of Visit: Evaluation    See electronic medical record for Abuse and Falls Screening details.    Subjective       Presenting condition or subjective complaint: Stiffness, soreness, difficulty sleeping with sharp pain in IT band, knee, sometimes hip and ankles.  Mostly right side but can be both sides.  Date of onset:      Relevant medical history: History of fractures; Menopause; Overweight; Pain at night or rest   Dates & types of surgery: 1998, ectopic pregnancy, removal of one ovary and fallopian tube; 2011, foot surgery on right foot to temove bone spur (or maybe 2009 or 2010?  It was so long ago, I can t remember date but it would be in my file)    Prior diagnostic imaging/testing results: MRI; X-ray; Bone scan     Prior therapy history for the same diagnosis, illness or injury: Yes 16 years ago after stress fracture in femur when training for marathon.   Had cortizone injection in hip a few years before that.  After stress fracture, two months off my leg, several months PT.        Living Environment  Social support: With a significant other or spouse   Type of home: House; Multi-level   Stairs to enter the home: Yes 5 Is there a railing: Yes   Ramp: No   Stairs inside the home: Yes 15 Is there a railing: Yes   Help at home: None  Equipment owned:       Employment: No    Hobbies/Interests: Reading, walking, puzzles, games, art    Patient goals for therapy: Sleep, walk, stand without pain.  I realize some discomfort is normal but the shooting pain in my knee making me wonder if I need to stop walking is more pain than I think I should have at my age.    Pain assessment: Pain present     Objective   HIP EVALUATION  PAIN: Pain Level at Rest: 0/10  Pain Level with Use: 7/10  Pain Location: hip and knee  Pain Quality: Aching  Pain Frequency: intermittent  Pain is Worst: nighttime  Pain is Exacerbated By: walking, sleeping  Pain is Relieved By: none  Pain Progression:  Worsened    GAIT:   Weightbearing Status: WBAT  Assistive Device(s): None  Gait Deviations:  decreased trunk swing to R  BALANCE/PROPRIOCEPTION: Single Leg Stance Eyes Open (seconds): 5 seconds B  WEIGHTBEARING ALIGNMENT: WNL  NON-WEIGHTBEARING ALIGNMENT: WNL   ROM: AROM WNL  PROM WNL  STRENGTH:   Pain: - none + mild ++ moderate +++ severe  Strength Scale: 0-5/5 Left Right   Hip Abduction 4+ 4     Poor core stability strength    Assessment & Plan   CLINICAL IMPRESSIONS  Medical Diagnosis: knee and hip pain    Treatment Diagnosis: knee and hip pain   Impression/Assessment: Patient is a 57 year old female with knee and hip complaints.  The following significant findings have been identified: Pain, Decreased strength, Impaired balance, Decreased proprioception, Impaired gait, Impaired muscle performance, and Decreased activity tolerance. These impairments interfere with their ability to perform self care tasks, recreational activities, household chores, household mobility, and community mobility as compared to previous level of function.     Clinical Decision Making (Complexity):  Clinical Presentation: Stable/Uncomplicated  Clinical Presentation Rationale: based on medical and personal factors listed in PT evaluation  Clinical Decision Making (Complexity): Low complexity    PLAN OF CARE  Treatment Interventions:  Interventions: Manual Therapy, Neuromuscular Re-education, Therapeutic Activity, Therapeutic Exercise, Self-Care/Home Management    Long Term Goals     PT Goal 1  Goal Description: Walk 30 minutes  Rationale: to maximize safety and independence with performance of ADLs and functional tasks;to maximize safety and independence within the home;to maximize safety and independence within the community;to maximize safety and independence with transportation  Target Date: 10/31/23      Frequency of Treatment: 2x/month  Duration of Treatment: 3 months    Recommended Referrals to Other Professionals: Physical  Therapy  Education Assessment:        Risks and benefits of evaluation/treatment have been explained.   Patient/Family/caregiver agrees with Plan of Care.     Evaluation Time:     PT Kong, Low Complexity Minutes (47195): 15       Signing Clinician: Molly Reich PT

## 2023-09-20 ENCOUNTER — ANCILLARY PROCEDURE (OUTPATIENT)
Dept: MAMMOGRAPHY | Facility: CLINIC | Age: 58
End: 2023-09-20
Payer: COMMERCIAL

## 2023-09-20 DIAGNOSIS — Z12.31 VISIT FOR SCREENING MAMMOGRAM: ICD-10-CM

## 2023-09-20 PROCEDURE — 77067 SCR MAMMO BI INCL CAD: CPT | Mod: TC | Performed by: RADIOLOGY

## 2023-09-20 PROCEDURE — 77063 BREAST TOMOSYNTHESIS BI: CPT | Mod: TC | Performed by: RADIOLOGY

## 2023-10-31 ENCOUNTER — HOSPITAL ENCOUNTER (OUTPATIENT)
Dept: GENERAL RADIOLOGY | Facility: HOSPITAL | Age: 58
Discharge: HOME | End: 2023-10-31
Attending: PHYSICIAN ASSISTANT | Admitting: PHYSICIAN ASSISTANT
Payer: COMMERCIAL

## 2023-10-31 ENCOUNTER — OFFICE VISIT (OUTPATIENT)
Dept: FAMILY MEDICINE | Facility: CLINIC | Age: 58
End: 2023-10-31
Payer: COMMERCIAL

## 2023-10-31 ENCOUNTER — NURSE TRIAGE (OUTPATIENT)
Dept: NURSING | Facility: CLINIC | Age: 58
End: 2023-10-31
Payer: COMMERCIAL

## 2023-10-31 VITALS
RESPIRATION RATE: 16 BRPM | TEMPERATURE: 98.7 F | BODY MASS INDEX: 32.89 KG/M2 | HEART RATE: 90 BPM | WEIGHT: 210 LBS | SYSTOLIC BLOOD PRESSURE: 163 MMHG | OXYGEN SATURATION: 97 % | DIASTOLIC BLOOD PRESSURE: 90 MMHG

## 2023-10-31 DIAGNOSIS — R05.1 ACUTE COUGH: ICD-10-CM

## 2023-10-31 DIAGNOSIS — R03.0 ELEVATED BLOOD PRESSURE READING WITHOUT DIAGNOSIS OF HYPERTENSION: ICD-10-CM

## 2023-10-31 DIAGNOSIS — J06.9 VIRAL UPPER RESPIRATORY TRACT INFECTION WITH COUGH: Primary | ICD-10-CM

## 2023-10-31 PROCEDURE — 71046 X-RAY EXAM CHEST 2 VIEWS: CPT

## 2023-10-31 PROCEDURE — 99214 OFFICE O/P EST MOD 30 MIN: CPT | Performed by: PHYSICIAN ASSISTANT

## 2023-10-31 RX ORDER — ALBUTEROL SULFATE 90 UG/1
1-2 AEROSOL, METERED RESPIRATORY (INHALATION) EVERY 6 HOURS
Qty: 8.5 G | Refills: 0 | Status: SHIPPED | OUTPATIENT
Start: 2023-10-31 | End: 2024-04-10

## 2023-10-31 RX ORDER — PREDNISONE 20 MG/1
40 TABLET ORAL DAILY
Qty: 10 TABLET | Refills: 0 | Status: SHIPPED | OUTPATIENT
Start: 2023-10-31 | End: 2023-11-05

## 2023-10-31 NOTE — PROGRESS NOTES
URGENT CARE VISIT:    SUBJECTIVE:   Darlene Rhodes is a 58 year old female presenting with a chief complaint of cough - productive and wheezing.  Onset was 3 week(s) ago.   She denies the following symptoms: fever, stuffy nose, and sore throat  Course of illness is same.    Treatment measures tried include None tried with no relief of symptoms.  Predisposing factors include None.    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)      Allergies: Animal dander, Grass, Hydrocodone-acetaminophen, Hydromorphone, Hydroxyzine, Iodinated contrast media [iodinated contrast media], Morphine, Ondansetron, and Oxycodone-acetaminophen   Medications:   Current Outpatient Medications   Medication Sig Dispense Refill    albuterol (PROAIR HFA/PROVENTIL HFA/VENTOLIN HFA) 108 (90 Base) MCG/ACT inhaler Inhale 1-2 puffs into the lungs every 6 hours for 5 days 8.5 g 0    atorvastatin (LIPITOR) 10 MG tablet TAKE 1 TABLET (10 MG) BY MOUTH AT BEDTIME 30 tablet 8    LANsoprazole (PREVACID) 30 MG DR capsule [LANSOPRAZOLE (PREVACID) 30 MG CAPSULE] TAKE 1 CAPSULE (30 MG TOTAL) BY MOUTH DAILY. 90 capsule 3    lidocaine, viscous, (XYLOCAINE) 2 % solution Swish and swallow 30 mLs in mouth every 3 hours as needed for moderate pain (4-6) or pain (Heartburn) ; Max 8 doses/24 hour period. 100 mL 3    loratadine-pseudoePHEDrine (CLARITIN-D 24-HOUR)  MG 24 hr tablet 1 tab PO daily 30 tablet 3    predniSONE (DELTASONE) 20 MG tablet Take 2 tablets (40 mg) by mouth daily for 5 days 10 tablet 0    Vitamin D (Cholecalciferol) 25 MCG (1000 UT) CAPS  (Patient not taking: Reported on 5/11/2023)       Social History:   Social History     Tobacco Use    Smoking status: Never     Passive exposure: Never    Smokeless tobacco: Never   Substance Use Topics     Alcohol use: Yes     Comment: Alcoholic Drinks/day: 1-2 / week       ROS:  General: negative  Skin: negative  Eyes: negative  Ears/Nose/Throat: negative  Respiratory: Cough  Cardiovascular: negative  Gastrointestinal: negative  Genitourinary: negative  Musculoskeletal: negative  Neurologic: negative      OBJECTIVE:  BP (!) 163/90   Pulse 90   Temp 98.7  F (37.1  C) (Oral)   Resp 16   Wt 95.3 kg (210 lb)   SpO2 97%   BMI 32.89 kg/m    GENERAL APPEARANCE: healthy, alert and no distress  EYES: EOMI,  PERRL, conjunctiva clear  HENT: ear canals and TM's normal.  Nose and mouth without ulcers, erythema or lesions  NECK: supple, nontender, no lymphadenopathy  RESP: lungs clear to auscultation - no rales, rhonchi or wheezes  CV: regular rates and rhythm, normal S1 S2, no murmur noted  SKIN: no suspicious lesions or rashes    Labs:    Results for orders placed or performed during the hospital encounter of 10/31/23   XR Chest 2 Views     Status: None    Narrative    EXAM: XR CHEST 2 VIEWS  LOCATION: [facility]  DATE: 10/31/2023    INDICATION: Acute cough  COMPARISON: Chest radiograph 08/01/2018.      Impression    IMPRESSION: Heart size and pulmonary vascularity are normal. No focal consolidation, pleural effusion, or pneumothorax.       ASSESSMENT:    ICD-10-CM    1. Viral upper respiratory tract infection with cough  J06.9 XR Chest 2 Views     albuterol (PROAIR HFA/PROVENTIL HFA/VENTOLIN HFA) 108 (90 Base) MCG/ACT inhaler     predniSONE (DELTASONE) 20 MG tablet      2. Elevated blood pressure reading without diagnosis of hypertension  R03.0           PLAN:  Patient Instructions   Patient was educated on the natural course of condition. Take medications as directed. Side effects discussed. Conservative measures discussed including rest, increased fluids, humidifier, and over-the-counter cough suppressants. See your primary care provider if symptoms do not improve in 7 days. Recheck BP in a  week. I still elevated then follow-up with PCP. Seek emergency care if you develop shortness of breath or fever over 103.    Patient verbalized understanding and is agreeable to plan. The patient was discharged ambulatory and in stable condition.    Sidra Toscano PA-C ....................  10/31/2023   5:57 PM

## 2023-10-31 NOTE — TELEPHONE ENCOUNTER
Triage call  Patient calling has had a cough for  couple  weeks and no fever no runny nose. She has not been sleeping well and her chest has been feeling tigh.  Last night she tried to sleep n her left side and found it  more difficulty to breath.  She also has been wheezing.    Per protocol go to office now.  Care advice given.  Verbalizes understanding and agrees with plan. No appointments available  will go to urgent care.    Fara Del Rosario RN   [facility] Nurse Advisor  3:38 PM 10/31/2023        Reason for Disposition   Wheezing is present    Additional Information   Negative: Bluish (or gray) lips or face   Negative: SEVERE difficulty breathing (e.g., struggling for each breath, speaks in single words)   Negative: Rapid onset of cough and has hives   Negative: Coughing started suddenly after medicine, an allergic food or bee sting   Negative: Difficulty breathing after exposure to flames, smoke, or fumes   Negative: Sounds like a life-threatening emergency to the triager   Negative: Previous asthma attacks and this feels like asthma attack   Negative: Dry cough (non-productive; no sputum or minimal clear sputum) and within 14 days of COVID-19 Exposure   Negative: MODERATE difficulty breathing (e.g., speaks in phrases, SOB even at rest, pulse 100-120) and still present when not coughing   Negative: Chest pain present when not coughing   Negative: Passed out (i.e., fainted, collapsed and was not responding)   Negative: Patient sounds very sick or weak to the triager   Negative: Coughed up > 1 tablespoon (15 ml) blood (Exception: Blood-tinged sputum.)   Negative: MILD difficulty breathing (e.g., minimal/no SOB at rest, SOB with walking, pulse <100) and still present when not coughing   Negative: Fever > 103 F (39.4 C)   Negative: Fever > 101 F (38.3 C) and over 60 years of age   Negative: Fever > 100.0 F (37.8 C) and has diabetes mellitus or a weak immune system (e.g., HIV positive, cancer chemotherapy,  organ transplant, splenectomy, chronic steroids)   Negative: Fever > 100.0 F (37.8 C) and bedridden (e.g., CVA, chronic illness, recovering from surgery)   Negative: Increasing ankle swelling    Protocols used: Cough-A-OH

## 2024-01-18 DIAGNOSIS — E78.2 MIXED HYPERLIPIDEMIA: ICD-10-CM

## 2024-01-18 RX ORDER — ATORVASTATIN CALCIUM 10 MG/1
10 TABLET, FILM COATED ORAL AT BEDTIME
Qty: 30 TABLET | Refills: 1 | Status: SHIPPED | OUTPATIENT
Start: 2024-01-18 | End: 2024-02-14

## 2024-01-18 NOTE — TELEPHONE ENCOUNTER
Refill provided for 30 days with 1 additional refill.  Please clarify whether patient plans to continue to see me or is being seen elsewhere.  She is overdue for a physical or med check.

## 2024-01-18 NOTE — TELEPHONE ENCOUNTER
Pending Prescriptions:                       Disp   Refills    atorvastatin (LIPITOR) 10 MG tablet       30 tab*8            Sig: Take 1 tablet (10 mg) by mouth at bedtime     This prescription came in address to Dr Mabel Parsons / but was seen last by Sidra Tosacno PA-C

## 2024-02-08 ENCOUNTER — TELEPHONE (OUTPATIENT)
Dept: FAMILY MEDICINE | Facility: CLINIC | Age: 59
End: 2024-02-08
Payer: COMMERCIAL

## 2024-02-08 NOTE — TELEPHONE ENCOUNTER
Left message for patient to call back to schedule an appointment with Dr. Mabel Parsons. Appointment needed for medication refill.

## 2024-02-10 ENCOUNTER — HEALTH MAINTENANCE LETTER (OUTPATIENT)
Age: 59
End: 2024-02-10

## 2024-02-14 DIAGNOSIS — E78.2 MIXED HYPERLIPIDEMIA: ICD-10-CM

## 2024-02-14 RX ORDER — ATORVASTATIN CALCIUM 10 MG/1
10 TABLET, FILM COATED ORAL AT BEDTIME
Qty: 90 TABLET | Refills: 0 | Status: SHIPPED | OUTPATIENT
Start: 2024-02-14 | End: 2024-04-10

## 2024-02-19 ENCOUNTER — NURSE TRIAGE (OUTPATIENT)
Dept: NURSING | Facility: CLINIC | Age: 59
End: 2024-02-19
Payer: COMMERCIAL

## 2024-02-19 ENCOUNTER — OFFICE VISIT (OUTPATIENT)
Dept: FAMILY MEDICINE | Facility: CLINIC | Age: 59
End: 2024-02-19
Payer: COMMERCIAL

## 2024-02-19 VITALS
OXYGEN SATURATION: 94 % | RESPIRATION RATE: 16 BRPM | TEMPERATURE: 97.7 F | HEART RATE: 83 BPM | DIASTOLIC BLOOD PRESSURE: 84 MMHG | SYSTOLIC BLOOD PRESSURE: 146 MMHG

## 2024-02-19 DIAGNOSIS — Z71.1 FEARED COMPLAINT WITHOUT DIAGNOSIS: Primary | ICD-10-CM

## 2024-02-19 PROCEDURE — 99213 OFFICE O/P EST LOW 20 MIN: CPT | Performed by: NURSE PRACTITIONER

## 2024-02-19 NOTE — TELEPHONE ENCOUNTER
Pt calling with concerns of achy left thigh, below the waist. Happened a week ago. Symptoms lasted for few days and went away. Then yesterday, pt was wearing boots, and left big toe felt wet, denies getting socks wet. Then today pt up woke up to the feeling her toe being wet. Now Feeling of wetness is gone. Toe looks normal. Denies redness, swelling, fever  Pt denies injury, pain  Denies symptoms at the moment.     Triage to see HCP within 2-3 days, care advice given. Advise UC if no appointments available. Warm Transfer call to scheduling.     Dee Pineda, RN, BSN  2/19/2024 at 11:24 AM  [facility]      Reason for Disposition   MILD or MODERATE muscle aches or pain and taking a statin medicine (a lipid or cholesterol lowering drug)    Additional Information   Negative: Shock suspected (e.g., cold/pale/clammy skin, too weak to stand, low BP, rapid pulse)   Negative: Difficult to awaken or acting confused (e.g., disoriented, slurred speech)   Negative: Sounds like a life-threatening emergency to the triager   Negative: Dark (cola or tea-colored) or red-colored urine   Negative: Drinking very little and dehydration suspected (e.g., no urine > 12 hours, very dry mouth, very lightheaded)   Negative: Patient sounds very sick or weak to the triager   Negative: SEVERE pain (e.g., excruciating, unable to do any normal activities) and not improved 2 hours after pain medicine   Negative: SEVERE pain and taking a statin medicine (a lipid or cholesterol lowering drug)   Negative: Fever > 104 F (40 C)   Negative: Fever > 101 F (38.3 C) and over 60 years of age   Negative: Fever > 100.0 F  (37.8 C) and bedridden (e.g., CVA, chronic illness, recovering from surgery)   Negative: Fever > 100.0 F (37.8 C) and indwelling urinary catheter (e.g., Fry, coude)   Negative: Fever > 100.0 F (37.8 C) and diabetes mellitus or weak immune system (e.g., HIV positive, cancer chemo, splenectomy, organ transplant, chronic  steroids)   Negative: Fever present > 3 days (72 hours)   Negative: Muscle aches are unexplained and occur within 1 month of a tick bite   Negative: Diabetes mellitus or weak immune system (e.g., HIV positive, cancer chemo, splenectomy, organ transplant, chronic steroids)   Negative: MODERATE pain (e.g., interferes with normal activities) and present > 3 days    Protocols used: Muscle Aches and Body Pain-A-[place]

## 2024-02-19 NOTE — PROGRESS NOTES
"Assessment & Plan     Feared complaint without diagnosis         Patient with transient abnormal sensation described as \"feeling wet\" in the great toe couple times over the last 2 days.  Lasted for about 20 minutes this morning after waking up.    Normal exam today.  Explained unsure etiology, but doesn't seem dangerous.      Can see PCP if ongoing.            No follow-ups on file.    Melissa De Jesus, CNP  [facility] NANO Colon is a 58 year old female who presents to clinic today for the following health issues:  Chief Complaint   Patient presents with    Foot Problems     On and off Lt foot big toe wet but not wet started yesterday. No pain or swelling.     HPI    Had abnormal sensation in left great toe that lasted 20 minutes. Felt like sock/great toe was wet, but wasn't.      Better now.  Asymptomatic.      Similar sensation this morning after waking up.           Review of Systems    See HPI         Objective    BP (!) 146/84   Pulse 83   Temp 97.7  F (36.5  C) (Oral)   Resp 16   SpO2 94%   Physical Exam  Constitutional:       Appearance: Normal appearance.   Cardiovascular:      Pulses: Normal pulses.   Pulmonary:      Effort: Pulmonary effort is normal.   Skin:     General: Skin is warm and dry.      Findings: No bruising.   Neurological:      Mental Status: She is alert.   Psychiatric:         Mood and Affect: Mood normal.                  "

## 2024-02-27 ENCOUNTER — OFFICE VISIT (OUTPATIENT)
Dept: DERMATOLOGY | Facility: CLINIC | Age: 59
End: 2024-02-27
Payer: COMMERCIAL

## 2024-02-27 DIAGNOSIS — L81.4 LENTIGO: ICD-10-CM

## 2024-02-27 DIAGNOSIS — D22.9 NEVUS: Primary | ICD-10-CM

## 2024-02-27 DIAGNOSIS — L82.1 SEBORRHEIC KERATOSIS: ICD-10-CM

## 2024-02-27 DIAGNOSIS — D18.01 ANGIOMA OF SKIN: ICD-10-CM

## 2024-02-27 PROCEDURE — 99213 OFFICE O/P EST LOW 20 MIN: CPT | Performed by: PHYSICIAN ASSISTANT

## 2024-02-27 ASSESSMENT — PAIN SCALES - GENERAL: PAINLEVEL: NO PAIN (0)

## 2024-02-27 NOTE — LETTER
2/27/2024         RE: Darlene Rhodes  [address]  [place] [address]        Dear Colleague,    Thank you for referring your patient, Darlene Rhodes, to the [facility]. Please see a copy of my visit note below.    HPI:   Chief complaints: Darlene Rhodes is a pleasant 58 year old female who presents for Full skin cancer screening to rule out skin cancer   Last Skin Exam: 1 year ago     1st Baseline: No  Personal HX of Skin Cancer: no   Personal HX of Malignant Melanoma: no   Family HX of Skin Cancer / Malignant Melanoma: no  Personal HX of Atypical Moles:   no  Risk factors: history of sun exposure  New / Changing lesions: none  Social History:   On review of systems, there are no further skin complaints, patient is feeling otherwise well.   ROS of the following were done and are negative: Constitutional, Eyes, Ears, Nose,   Mouth, Throat, Cardiovascular, Respiratory, GI, Genitourinary, Musculoskeletal,   Psychiatric, Endocrine, Allergic/Immunologic.    PHYSICAL EXAM:   Breastfeeding No   Skin exam performed as follows: Type 2 skin. Mood appropriate  Alert and Oriented X 3. Well developed, well nourished in no distress.  General appearance: Normal  Head including face: Normal  Eyes: conjunctiva and lids: Normal  Mouth: Lips, teeth, gums: Normal  Neck: Normal  Chest-breast/axillae: Normal  Back: Normal  Spleen and liver: Normal  Cardiovascular: Exam of peripheral vascular system by observation for swelling, varicosities, edema: Normal  Genitalia: groin, buttocks: Normal  Extremities: digits/nails (clubbing): Normal  Eccrine and Apocrine glands: Normal  Right upper extremity: Normal  Left upper extremity: Normal  Right lower extremity: Normal  Left lower extremity: Normal  Skin: Scalp and body hair: See below    Pt deferred exam of breasts, groin, buttocks: No    Other physical findings:  1. Multiple pigmented macules on extremities and trunk  2. Multiple pigmented  macules on face, trunk and extremities  3. Multiple vascular papules on trunk, arms and legs  4. Multiple scattered keratotic plaques       Except as noted above, no other signs of skin cancer or melanoma.     ASSESSMENT/PLAN:   Benign Full skin cancer screening today. . Patient with history of none  Advised on monthly self exams and 1 year  Patient Education: Appropriate brochures given.    Multiple benign appearing melanocytic nevi on arms, legs and trunk. Discussed ABCDEs of melanoma and sunscreen.   Multiple lentigos on arms, legs and trunk. Advised benign, no treatment needed.  Multiple scattered angiomas. Advised benign, no treatment needed.   Seborrheic keratosis on arms, legs and trunk. Advised benign, no treatment needed.          Follow-up: yearly/PRN sooner    1.) Patient was asked about new and changing moles. YES  2.) Patient received a complete physical skin examination: YES  3.) Patient was counseled to perform a monthly self skin examination: YES  Scribed By: Gema Ambrocio, MS, PAJACK        Again, thank you for allowing me to participate in the care of your patient.        Sincerely,        Gema Ambrocio PA-C

## 2024-02-27 NOTE — PROGRESS NOTES
HPI:   Chief complaints: Darlene Rhodes is a pleasant 58 year old female who presents for Full skin cancer screening to rule out skin cancer   Last Skin Exam: 1 year ago     1st Baseline: No  Personal HX of Skin Cancer: no   Personal HX of Malignant Melanoma: no   Family HX of Skin Cancer / Malignant Melanoma: no  Personal HX of Atypical Moles:   no  Risk factors: history of sun exposure  New / Changing lesions: none  Social History:   On review of systems, there are no further skin complaints, patient is feeling otherwise well.   ROS of the following were done and are negative: Constitutional, Eyes, Ears, Nose,   Mouth, Throat, Cardiovascular, Respiratory, GI, Genitourinary, Musculoskeletal,   Psychiatric, Endocrine, Allergic/Immunologic.    PHYSICAL EXAM:   Breastfeeding No   Skin exam performed as follows: Type 2 skin. Mood appropriate  Alert and Oriented X 3. Well developed, well nourished in no distress.  General appearance: Normal  Head including face: Normal  Eyes: conjunctiva and lids: Normal  Mouth: Lips, teeth, gums: Normal  Neck: Normal  Chest-breast/axillae: Normal  Back: Normal  Spleen and liver: Normal  Cardiovascular: Exam of peripheral vascular system by observation for swelling, varicosities, edema: Normal  Genitalia: groin, buttocks: Normal  Extremities: digits/nails (clubbing): Normal  Eccrine and Apocrine glands: Normal  Right upper extremity: Normal  Left upper extremity: Normal  Right lower extremity: Normal  Left lower extremity: Normal  Skin: Scalp and body hair: See below    Pt deferred exam of breasts, groin, buttocks: No    Other physical findings:  1. Multiple pigmented macules on extremities and trunk  2. Multiple pigmented macules on face, trunk and extremities  3. Multiple vascular papules on trunk, arms and legs  4. Multiple scattered keratotic plaques       Except as noted above, no other signs of skin cancer or melanoma.     ASSESSMENT/PLAN:   Benign Full skin cancer screening  today. . Patient with history of none  Advised on monthly self exams and 1 year  Patient Education: Appropriate brochures given.    Multiple benign appearing melanocytic nevi on arms, legs and trunk. Discussed ABCDEs of melanoma and sunscreen.   Multiple lentigos on arms, legs and trunk. Advised benign, no treatment needed.  Multiple scattered angiomas. Advised benign, no treatment needed.   Seborrheic keratosis on arms, legs and trunk. Advised benign, no treatment needed.          Follow-up: yearly/PRN sooner    1.) Patient was asked about new and changing moles. YES  2.) Patient received a complete physical skin examination: YES  3.) Patient was counseled to perform a monthly self skin examination: YES  Scribed By: Gema Ambrocio MS, PA-C

## 2024-04-04 SDOH — HEALTH STABILITY: PHYSICAL HEALTH: ON AVERAGE, HOW MANY DAYS PER WEEK DO YOU ENGAGE IN MODERATE TO STRENUOUS EXERCISE (LIKE A BRISK WALK)?: 4 DAYS

## 2024-04-04 SDOH — HEALTH STABILITY: PHYSICAL HEALTH: ON AVERAGE, HOW MANY MINUTES DO YOU ENGAGE IN EXERCISE AT THIS LEVEL?: 30 MIN

## 2024-04-04 ASSESSMENT — SOCIAL DETERMINANTS OF HEALTH (SDOH): HOW OFTEN DO YOU GET TOGETHER WITH FRIENDS OR RELATIVES?: ONCE A WEEK

## 2024-04-08 PROBLEM — K31.7 POLYP OF STOMACH AND DUODENUM: Status: ACTIVE | Noted: 2021-08-04

## 2024-04-10 ENCOUNTER — OFFICE VISIT (OUTPATIENT)
Dept: FAMILY MEDICINE | Facility: CLINIC | Age: 59
End: 2024-04-10
Payer: COMMERCIAL

## 2024-04-10 VITALS
HEIGHT: 67 IN | OXYGEN SATURATION: 94 % | BODY MASS INDEX: 35.2 KG/M2 | SYSTOLIC BLOOD PRESSURE: 120 MMHG | TEMPERATURE: 97.1 F | HEART RATE: 80 BPM | WEIGHT: 224.3 LBS | RESPIRATION RATE: 16 BRPM | DIASTOLIC BLOOD PRESSURE: 80 MMHG

## 2024-04-10 DIAGNOSIS — E66.01 CLASS 2 SEVERE OBESITY DUE TO EXCESS CALORIES WITH SERIOUS COMORBIDITY AND BODY MASS INDEX (BMI) OF 35.0 TO 35.9 IN ADULT (H): ICD-10-CM

## 2024-04-10 DIAGNOSIS — K21.9 GASTROESOPHAGEAL REFLUX DISEASE WITHOUT ESOPHAGITIS: ICD-10-CM

## 2024-04-10 DIAGNOSIS — E66.812 CLASS 2 SEVERE OBESITY DUE TO EXCESS CALORIES WITH SERIOUS COMORBIDITY AND BODY MASS INDEX (BMI) OF 35.0 TO 35.9 IN ADULT (H): ICD-10-CM

## 2024-04-10 DIAGNOSIS — E78.2 MIXED HYPERLIPIDEMIA: ICD-10-CM

## 2024-04-10 DIAGNOSIS — R87.810 CERVICAL HIGH RISK HPV (HUMAN PAPILLOMAVIRUS) TEST POSITIVE: ICD-10-CM

## 2024-04-10 DIAGNOSIS — Z12.4 CERVICAL CANCER SCREENING: ICD-10-CM

## 2024-04-10 DIAGNOSIS — Z91.09 ENVIRONMENTAL ALLERGIES: ICD-10-CM

## 2024-04-10 DIAGNOSIS — Z00.00 ROUTINE GENERAL MEDICAL EXAMINATION AT A HEALTH CARE FACILITY: Primary | ICD-10-CM

## 2024-04-10 DIAGNOSIS — E04.1 NONTOXIC UNINODULAR GOITER: ICD-10-CM

## 2024-04-10 DIAGNOSIS — K76.0 NAFLD (NONALCOHOLIC FATTY LIVER DISEASE): ICD-10-CM

## 2024-04-10 DIAGNOSIS — E88.89 CYP2D6 POOR METABOLIZER (H): ICD-10-CM

## 2024-04-10 PROCEDURE — G0145 SCR C/V CYTO,THINLAYER,RESCR: HCPCS | Performed by: FAMILY MEDICINE

## 2024-04-10 PROCEDURE — 99214 OFFICE O/P EST MOD 30 MIN: CPT | Mod: 25 | Performed by: FAMILY MEDICINE

## 2024-04-10 PROCEDURE — 87624 HPV HI-RISK TYP POOLED RSLT: CPT | Performed by: FAMILY MEDICINE

## 2024-04-10 PROCEDURE — 99396 PREV VISIT EST AGE 40-64: CPT | Performed by: FAMILY MEDICINE

## 2024-04-10 RX ORDER — ATORVASTATIN CALCIUM 10 MG/1
10 TABLET, FILM COATED ORAL AT BEDTIME
Qty: 90 TABLET | Refills: 3 | Status: SHIPPED | OUTPATIENT
Start: 2024-04-10

## 2024-04-10 RX ORDER — LIDOCAINE HYDROCHLORIDE 20 MG/ML
30 SOLUTION OROPHARYNGEAL
Qty: 100 ML | Refills: 3 | Status: SHIPPED | OUTPATIENT
Start: 2024-04-10

## 2024-04-10 ASSESSMENT — PAIN SCALES - GENERAL: PAINLEVEL: NO PAIN (0)

## 2024-04-10 NOTE — PROGRESS NOTES
Preventive Care Visit  [facility] CHITRALALITO Parsons MD, Family Medicine  Apr 10, 2024      Assessment & Plan     Routine general medical examination at a health care facility  Routine history and physical, updated in EMR.  Patient will return fasting for labs as below.  Pap smear updated today.  Immunizations are up to date with the exception of COVID-19 and flu, patient declines today.  Had patient sign CHIQUITA for colonoscopy report from [facility].  Mammogram is up to date, will be due in August.  Plan repeat physical in 1 year.  - Comprehensive metabolic panel (BMP + Alb, Alk Phos, ALT, AST, Total. Bili, TP); Future  - TSH with free T4 reflex; Future  - CBC with platelets; Future    Mixed hyperlipidemia  Patient continues on Lipitor and tolerates this well.  She will return fasting for labs as below.  - atorvastatin (LIPITOR) 10 MG tablet; Take 1 tablet (10 mg) by mouth at bedtime  - Lipid panel reflex to direct LDL Fasting; Future  - Comprehensive metabolic panel (BMP + Alb, Alk Phos, ALT, AST, Total. Bili, TP); Future  - TSH with free T4 reflex; Future  - CBC with platelets; Future    Cervical cancer screening  Screening pap smear updated today.  Exam normal.  - Pap Screen with HPV - recommended age 30 - 65 years    Cervical high risk HPV (human papillomavirus) test positive  Due for cotesting, was normal last year.    CYP2D6 poor metabolizer (H)  Had patient sign CHIQUITA for the anesthesia flowsheet from her last colonoscopy.  She would like to know exactly what anesthetic was used.    Gastroesophageal reflux disease without esophagitis  Continues on Prevacid.  Uses GI cocktail (mixes herself) very sparingly for severe reflux.  - lidocaine, viscous, (XYLOCAINE) 2 % solution; Swish and swallow 30 mLs in mouth every 3 hours as needed for moderate pain or pain (Heartburn) ; Max 8 doses/24 hour period.  - Comprehensive metabolic panel (BMP + Alb, Alk Phos, ALT, AST, Total. Bili, TP); Future    Class  "2 severe obesity due to excess calories with serious comorbidity and body mass index (BMI) of 35.0 to 35.9 in adult (H)  Discussed a healthy, vegetable-rich diet and regular cardiovascular exercise.  Comorbid condition is hyperlipidemia.  - Comprehensive metabolic panel (BMP + Alb, Alk Phos, ALT, AST, Total. Bili, TP); Future  - TSH with free T4 reflex; Future  - CBC with platelets; Future    Environmental allergies  Patient has undergone allergy injections in the past, but notes a gradual recurrence of symptoms.  Discussed antihistamine orally, can add eye drops or nasal spray also.    NAFLD (nonalcoholic fatty liver disease)  LFTs will be updated with upcoming labs.  - Comprehensive metabolic panel (BMP + Alb, Alk Phos, ALT, AST, Total. Bili, TP); Future  - CBC with platelets; Future    Nontoxic uninodular goiter  Plan recheck thyroid hormone level with upcoming labs.  No previous thyroid imaging available.  - TSH with free T4 reflex; Future              BMI  Estimated body mass index is 35.13 kg/m  as calculated from the following:    Height as of this encounter: 1.702 m (5' 7\").    Weight as of this encounter: 101.7 kg (224 lb 4.8 oz).   Weight management plan: Discussed healthy diet and exercise guidelines    Counseling  Appropriate preventive services were discussed with this patient, including applicable screening as appropriate for fall prevention, nutrition, physical activity, Tobacco-use cessation, weight loss and cognition.  Checklist reviewing preventive services available has been given to the patient.  Reviewed patient's diet, addressing concerns and/or questions.           Desmond Colon is a 58 year old, presenting for the following:  Physical        4/10/2024     1:47 PM   Additional Questions   Roomed by Nini Haile   Accompanied by Self        Health Care Directive  Patient does not have a Health Care Directive or Living Will: Discussed advance care planning with patient; however, patient " declined at this time.    HPI    Will be due for colonoscopy in 2016 it appears.  Last one was at [facility], but report unavailable.  Per patient's recollection, this was normal.  There was some trouble with anesthesia due to her metabolic enzyme issue.  Still with significant symptoms of environmental allergies, particularly in the spring.  Had allergy shots in the past.              4/4/2024   General Health   How would you rate your overall physical health? Good   Feel stress (tense, anxious, or unable to sleep) To some extent   (!) STRESS CONCERN      4/4/2024   Nutrition   Three or more servings of calcium each day? Yes   Diet: Regular (no restrictions)   How many servings of fruit and vegetables per day? (!) 2-3   How many sweetened beverages each day? 0-1         4/4/2024   Exercise   Days per week of moderate/strenous exercise 4 days   Average minutes spent exercising at this level 30 min         4/4/2024   Social Factors   Frequency of gathering with friends or relatives Once a week   Worry food won't last until get money to buy more No   Food not last or not have enough money for food? No   Do you have housing?  Yes   Are you worried about losing your housing? No   Lack of transportation? No   Unable to get utilities (heat,electricity)? No         4/4/2024   Fall Risk   Fallen 2 or more times in the past year? No   Trouble with walking or balance? No          4/4/2024   Dental   Dentist two times every year? Yes         4/4/2024   TB Screening   Were you born outside of the [place]? No         Today's PHQ-2 Score:       4/10/2024     1:39 PM   PHQ-2 ( 1999 [employer])   Q1: Little interest or pleasure in doing things 0   Q2: Feeling down, depressed or hopeless 0   PHQ-2 Score 0   Q1: Little interest or pleasure in doing things Not at all   Q2: Feeling down, depressed or hopeless Not at all   PHQ-2 Score 0           4/4/2024   Substance Use   Alcohol more than 3/day or more than 7/wk No   Do you use any other  substances recreationally? No     Social History     Tobacco Use    Smoking status: Never     Passive exposure: Never    Smokeless tobacco: Never   Vaping Use    Vaping status: Never Used   Substance Use Topics    Alcohol use: Yes     Comment: Alcoholic Drinks/day: 1-2 / week    Drug use: Never             4/4/2024   Breast Cancer Screening   Family history of breast, colon, or ovarian cancer? No / Unknown         9/20/2023   LAST FHS-7 RESULTS   1st degree relative breast or ovarian cancer No   Any relative bilateral breast cancer No   Any male have breast cancer No   Any ONE woman have BOTH breast AND ovarian cancer No   Any woman with breast cancer before 50yrs No   2 or more relatives with breast AND/OR ovarian cancer No   2 or more relatives with breast AND/OR bowel cancer No       Mammogram Screening - Mammogram every 1-2 years updated in Health Maintenance based on mutual decision making        4/4/2024   STI Screening   New sexual partner(s) since last STI/HIV test? No     History of abnormal Pap smear: YES - updated in Problem List and Health Maintenance accordingly        Latest Ref Rng & Units 5/11/2023    11:42 AM 4/12/2022    12:22 PM   PAP / HPV   PAP  Negative for Intraepithelial Lesion or Malignancy (NILM)  Negative for Intraepithelial Lesion or Malignancy (NILM)    HPV 16 DNA Negative Negative  Negative    HPV 18 DNA Negative Negative  Negative    Other HR HPV Negative Negative  Positive      ASCVD Risk   The 10-year ASCVD risk score (Sanaz CABRERA, et al., 2019) is: 2.4%    Values used to calculate the score:      Age: 58 years      Sex: Female      Is Non- : No      Diabetic: No      Tobacco smoker: No      Systolic Blood Pressure: 120 mmHg      Is BP treated: No      HDL Cholesterol: 56 mg/dL      Total Cholesterol: 225 mg/dL           Reviewed and updated as needed this visit by Provider   Tobacco  Allergies  Meds  Problems  Med Hx  Surg Hx  Fam Hx  Soc   Hx  Sexual Activity          Past Medical History:   Diagnosis Date    Cervical high risk HPV (human papillomavirus) test positive 2022    Gastroesophageal reflux disease with esophagitis     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)     Ulcer of esophagus without bleeding 2021     Past Surgical History:   Procedure Laterality Date    BIOPSY  2018    endoscopy    COLONOSCOPY  2018    normal screening    FOOT SURGERY Right     bone spur removal    GYN SURGERY  1998    ectopic pregnancy    ORTHOPEDIC SURGERY      removal of bone spur on foot    [place] RX ECTOP PREG BY SCOPE,RMV TUBE/OVRY      Description: Laparoscopic Excision Of Ectopic Pregnancy And Salpingectomy;  Recorded: 2010;     OB History    Para Term  AB Living   4 3 3 0 1 3   SAB IAB Ectopic Multiple Live Births   0 0 1 0 0      # Outcome Date GA Lbr Nicanor/2nd Weight Sex Type Anes PTL Lv   4 Ectopic            3 Term            2 Term            1 Term              BP Readings from Last 3 Encounters:   04/10/24 120/80   24 (!) 146/84   10/31/23 (!) 163/90    Wt Readings from Last 3 Encounters:   04/10/24 101.7 kg (224 lb 4.8 oz)   10/31/23 95.3 kg (210 lb)   23 95.3 kg (210 lb)                  Patient Active Problem List   Diagnosis    CYP2D6 poor metabolizer (H)    HH (hiatus hernia)    Gastroesophageal reflux disease without esophagitis    Multiple gastric polyps    Cervical high risk HPV (human papillomavirus) test positive    NAFLD (nonalcoholic fatty liver disease)    Vitamin D deficiency    Other acquired deformity of toe    Nontoxic uninodular goiter    Hyperlipidemia    Knee pain, right    Hip pain, right    Class 2 severe obesity due to excess calories with serious comorbidity in adult (H)    Environmental allergies     Past Surgical History:   Procedure Laterality Date    BIOPSY  2018    endoscopy    COLONOSCOPY  2018    normal screening    FOOT SURGERY Right     bone spur  removal    GYN SURGERY  1998    ectopic pregnancy    ORTHOPEDIC SURGERY  2007    removal of bone spur on foot    [place] RX ECTOP PREG BY SCOPE,RMV TUBE/OVRY      Description: Laparoscopic Excision Of Ectopic Pregnancy And Salpingectomy;  Recorded: 08/31/2010;       Social History     Tobacco Use    Smoking status: Never     Passive exposure: Never    Smokeless tobacco: Never   Substance Use Topics    Alcohol use: Yes     Comment: Alcoholic Drinks/day: 1-2 / week     Family History   Problem Relation Age of Onset    No Known Problems Mother     No Known Problems Father     No Known Problems Sister     No Known Problems Sister     No Known Problems Sister     No Known Problems Maternal Grandmother     No Known Problems Maternal Grandfather     Anuerysm Paternal Grandmother         brain    Prostate Cancer Paternal Grandfather     Heart Defect Daughter         congenital septal defects    Lymphoma Daughter 25    No Known Problems Daughter     Breast Cancer No family hx of     Colon Cancer No family hx of     Diabetes No family hx of          Current Outpatient Medications   Medication Sig Dispense Refill    atorvastatin (LIPITOR) 10 MG tablet Take 1 tablet (10 mg) by mouth at bedtime 90 tablet 3    LANsoprazole (PREVACID) 30 MG DR capsule [LANSOPRAZOLE (PREVACID) 30 MG CAPSULE] TAKE 1 CAPSULE (30 MG TOTAL) BY MOUTH DAILY. 90 capsule 3    lidocaine, viscous, (XYLOCAINE) 2 % solution Swish and swallow 30 mLs in mouth every 3 hours as needed for moderate pain or pain (Heartburn) ; Max 8 doses/24 hour period. 100 mL 3    loratadine-pseudoePHEDrine (CLARITIN-D 24-HOUR)  MG 24 hr tablet 1 tab PO daily (Patient not taking: Reported on 2/27/2024) 30 tablet 3    Vitamin D (Cholecalciferol) 25 MCG (1000 UT) CAPS  (Patient not taking: Reported on 5/11/2023)       Allergies   Allergen Reactions    Animal Dander Unknown    Grass Unknown    Hydrocodone-Acetaminophen Unknown     Annotation: patient does not respond to this  "medication      Hydromorphone Unknown     Annotation: patient gets dizzy, headache, and does not respond to this      Hydroxyzine Unknown     Annotation: does not work    Annotation: does not work      Iodinated Contrast Media [Iodinated Contrast Media] Itching     Other      Morphine Unknown     Annotation: patient does not respond to this medication      Ondansetron Unknown     Annotation: does not work      Oxycodone-Acetaminophen Unknown     Annotation: patient does not respond to this medication           Review of Systems  Constitutional, HEENT, cardiovascular, pulmonary, GI, , musculoskeletal, neuro, skin, endocrine and psych systems are negative, except as otherwise noted.     Objective    Exam  /80   Pulse 80   Temp 97.1  F (36.2  C) (Temporal)   Resp 16   Ht 1.702 m (5' 7\")   Wt 101.7 kg (224 lb 4.8 oz)   SpO2 94%   BMI 35.13 kg/m     Estimated body mass index is 35.13 kg/m  as calculated from the following:    Height as of this encounter: 1.702 m (5' 7\").    Weight as of this encounter: 101.7 kg (224 lb 4.8 oz).    Physical Exam  GENERAL: alert and no distress  EYES: Eyes grossly normal to inspection, PERRL and conjunctivae and sclerae normal  HENT: ear canals and TM's normal, nose and mouth without ulcers or lesions  NECK: no adenopathy, no asymmetry, masses, or scars  RESP: lungs clear to auscultation - no rales, rhonchi or wheezes  BREAST: normal without masses, tenderness or nipple discharge and no palpable axillary masses or adenopathy  CV: regular rate and rhythm, normal S1 S2, no S3 or S4, no murmur, click or rub, no peripheral edema  ABDOMEN: soft, nontender, no hepatosplenomegaly, no masses and bowel sounds normal   (female) w/bimanual: normal female external genitalia, normal urethral meatus, normal vaginal mucosa, and normal cervix/adnexa/uterus without masses or discharge  MS: no gross musculoskeletal defects noted, no edema  SKIN: no suspicious lesions or rashes  NEURO: " Normal strength and tone, mentation intact and speech normal  PSYCH: mentation appears normal, affect normal/bright        Signed Electronically by: Mabel Parsons MD

## 2024-04-10 NOTE — PATIENT INSTRUCTIONS
Preventive Care Advice   This is general advice given by our system to help you stay healthy. However, your care team may have specific advice just for you. Please talk to your care team about your preventive care needs.  Nutrition  Eat 5 or more servings of fruits and vegetables each day.  Try wheat bread, brown rice and whole grain pasta (instead of white bread, rice, and pasta).  Get enough calcium and vitamin D. Check the label on foods and aim for 100% of the RDA (recommended daily allowance).  Lifestyle  Exercise at least 150 minutes each week   (30 minutes a day, 5 days a week).  Do muscle strengthening activities 2 days a week. These help control your weight and prevent disease.  No smoking.  Wear sunscreen to prevent skin cancer.  Have a dental exam and cleaning every 6 months.  Yearly exams  See your health care team every year to talk about:  Any changes in your health.  Any medicines your care team has prescribed.  Preventive care, family planning, and ways to prevent chronic diseases.  Shots (vaccines)   HPV shots (up to age 26), if you've never had them before.  Hepatitis B shots (up to age 59), if you've never had them before.  COVID-19 shot: Get this shot when it's due.  Flu shot: Get a flu shot every year.  Tetanus shot: Get a tetanus shot every 10 years.  Pneumococcal, hepatitis A, and RSV shots: Ask your care team if you need these based on your risk.  Shingles shot (for age 50 and up).  General health tests  Diabetes screening:  Starting at age 35, Get screened for diabetes at least every 3 years.  If you are younger than age 35, ask your care team if you should be screened for diabetes.  Cholesterol test: At age 39, start having a cholesterol test every 5 years, or more often if advised.  Bone density scan (DEXA): At age 50, ask your care team if you should have this scan for osteoporosis (brittle bones).  Hepatitis C: Get tested at least once in your life.  STIs (sexually transmitted  infections)  Before age 24: Ask your care team if you should be screened for STIs.  After age 24: Get screened for STIs if you're at risk. You are at risk for STIs (including HIV) if:  You are sexually active with more than one person.  You don't use condoms every time.  You or a partner was diagnosed with a sexually transmitted infection.  If you are at risk for HIV, ask about PrEP medicine to prevent HIV.  Get tested for HIV at least once in your life, whether you are at risk for HIV or not.  Cancer screening tests  Cervical cancer screening: If you have a cervix, begin getting regular cervical cancer screening tests at age 21. Most people who have regular screenings with normal results can stop after age 65. Talk about this with your provider.  Breast cancer scan (mammogram): If you've ever had breasts, begin having regular mammograms starting at age 40. This is a scan to check for breast cancer.  Colon cancer screening: It is important to start screening for colon cancer at age 45.  Have a colonoscopy test every 10 years (or more often if you're at risk) Or, ask your provider about stool tests like a FIT test every year or Cologuard test every 3 years.  To learn more about your testing options, visit: https://www.[email or web address]/306468.pdf.  For help making a decision, visit: https://bit.ly/zt85811.  Prostate cancer screening test: If you have a prostate and are age 55 to 69, ask your provider if you would benefit from a yearly prostate cancer screening test.  Lung cancer screening: If you are a current or former smoker age 50 to 80, ask your care team if ongoing lung cancer screenings are right for you.  For informational purposes only. Not to replace the advice of your health care provider. Copyright   2023 [employer]. All rights reserved. Clinically reviewed by the [facility] Transitions Program. [employer] 823843 - REV 01/24.    Learning About Stress  What is stress?     Stress is your  body's response to a hard situation. Your body can have a physical, emotional, or mental response. Stress is a fact of life for most people, and it affects everyone differently. What causes stress for you may not be stressful for someone else.  A lot of things can cause stress. You may feel stress when you go on a job interview, take a test, or run a race. This kind of short-term stress is normal and even useful. It can help you if you need to work hard or react quickly. For example, stress can help you finish an important job on time.  Long-term stress is caused by ongoing stressful situations or events. Examples of long-term stress include long-term health problems, ongoing problems at work, or conflicts in your family. Long-term stress can harm your health.  How does stress affect your health?  When you are stressed, your body responds as though you are in danger. It makes hormones that speed up your heart, make you breathe faster, and give you a burst of energy. This is called the fight-or-flight stress response. If the stress is over quickly, your body goes back to normal and no harm is done.  But if stress happens too often or lasts too long, it can have bad effects. Long-term stress can make you more likely to get sick, and it can make symptoms of some diseases worse. If you tense up when you are stressed, you may develop neck, shoulder, or low back pain. Stress is linked to high blood pressure and heart disease.  Stress also harms your emotional health. It can make you metz, tense, or depressed. Your relationships may suffer, and you may not do well at work or school.  What can you do to manage stress?  You can try these things to help manage stress:   Do something active. Exercise or activity can help reduce stress. Walking is a great way to get started. Even everyday activities such as housecleaning or yard work can help.  Try yoga or henry chi. These techniques combine exercise and meditation. You may need  some training at first to learn them.  Do something you enjoy. For example, listen to music or go to a movie. Practice your hobby or do volunteer work.  Meditate. This can help you relax, because you are not worrying about what happened before or what may happen in the future.  Do guided imagery. Imagine yourself in any setting that helps you feel calm. You can use online videos, books, or a teacher to guide you.  Do breathing exercises. For example:  From a standing position, bend forward from the waist with your knees slightly bent. Let your arms dangle close to the floor.  Breathe in slowly and deeply as you return to a standing position. Roll up slowly and lift your head last.  Hold your breath for just a few seconds in the standing position.  Breathe out slowly and bend forward from the waist.  Let your feelings out. Talk, laugh, cry, and express anger when you need to. Talking with supportive friends or family, a counselor, or a elijah leader about your feelings is a healthy way to relieve stress. Avoid discussing your feelings with people who make you feel worse.  Write. It may help to write about things that are bothering you. This helps you find out how much stress you feel and what is causing it. When you know this, you can find better ways to cope.  What can you do to prevent stress?  You might try some of these things to help prevent stress:  Manage your time. This helps you find time to do the things you want and need to do.  Get enough sleep. Your body recovers from the stresses of the day while you are sleeping.  Get support. Your family, friends, and community can make a difference in how you experience stress.  Limit your news feed. Avoid or limit time on social media or news that may make you feel stressed.  Do something active. Exercise or activity can help reduce stress. Walking is a great way to get started.  Where can you learn more?  Go to https://www.healthwise.net/patiented  Enter N032 in the  "search box to learn more about \"Learning About Stress.\"  Current as of: October 24, 2023               Content Version: 14.0    4277-0214 [employer].   Care instructions adapted under license by your healthcare professional. If you have questions about a medical condition or this instruction, always ask your healthcare professional. [employer] disclaims any warranty or liability for your use of this information.      "

## 2024-04-11 PROBLEM — Z91.09 ENVIRONMENTAL ALLERGIES: Status: ACTIVE | Noted: 2024-04-11

## 2024-04-11 PROBLEM — K31.7 POLYP OF STOMACH AND DUODENUM: Status: RESOLVED | Noted: 2021-08-04 | Resolved: 2024-04-11

## 2024-04-11 PROBLEM — E66.811 CLASS 1 OBESITY WITH SERIOUS COMORBIDITY AND BODY MASS INDEX (BMI) OF 34.0 TO 34.9 IN ADULT: Status: RESOLVED | Noted: 2022-06-01 | Resolved: 2024-04-11

## 2024-04-11 PROBLEM — K22.10 ULCER OF ESOPHAGUS WITHOUT BLEEDING: Status: RESOLVED | Noted: 2021-08-04 | Resolved: 2024-04-11

## 2024-04-15 LAB
BKR LAB AP GYN ADEQUACY: NORMAL
BKR LAB AP GYN INTERPRETATION: NORMAL
BKR LAB AP HPV REFLEX: NORMAL
BKR LAB AP PREVIOUS ABNORMAL: NORMAL
PATH REPORT.COMMENTS IMP SPEC: NORMAL
PATH REPORT.COMMENTS IMP SPEC: NORMAL
PATH REPORT.RELEVANT HX SPEC: NORMAL

## 2024-04-18 ENCOUNTER — LAB (OUTPATIENT)
Dept: LAB | Facility: CLINIC | Age: 59
End: 2024-04-18
Payer: COMMERCIAL

## 2024-04-18 DIAGNOSIS — E04.1 NONTOXIC UNINODULAR GOITER: ICD-10-CM

## 2024-04-18 DIAGNOSIS — R73.01 IMPAIRED FASTING GLUCOSE: Primary | ICD-10-CM

## 2024-04-18 DIAGNOSIS — K21.9 GASTROESOPHAGEAL REFLUX DISEASE WITHOUT ESOPHAGITIS: ICD-10-CM

## 2024-04-18 DIAGNOSIS — K76.0 NAFLD (NONALCOHOLIC FATTY LIVER DISEASE): ICD-10-CM

## 2024-04-18 DIAGNOSIS — Z00.00 ROUTINE GENERAL MEDICAL EXAMINATION AT A HEALTH CARE FACILITY: ICD-10-CM

## 2024-04-18 DIAGNOSIS — E66.01 CLASS 2 SEVERE OBESITY DUE TO EXCESS CALORIES WITH SERIOUS COMORBIDITY AND BODY MASS INDEX (BMI) OF 35.0 TO 35.9 IN ADULT (H): ICD-10-CM

## 2024-04-18 DIAGNOSIS — E66.812 CLASS 2 SEVERE OBESITY DUE TO EXCESS CALORIES WITH SERIOUS COMORBIDITY AND BODY MASS INDEX (BMI) OF 35.0 TO 35.9 IN ADULT (H): ICD-10-CM

## 2024-04-18 DIAGNOSIS — E78.2 MIXED HYPERLIPIDEMIA: ICD-10-CM

## 2024-04-18 LAB
ERYTHROCYTE [DISTWIDTH] IN BLOOD BY AUTOMATED COUNT: 12.6 % (ref 10–15)
HCT VFR BLD AUTO: 43.2 % (ref 35–47)
HGB BLD-MCNC: 14.1 G/DL (ref 11.7–15.7)
MCH RBC QN AUTO: 29.1 PG (ref 26.5–33)
MCHC RBC AUTO-ENTMCNC: 32.6 G/DL (ref 31.5–36.5)
MCV RBC AUTO: 89 FL (ref 78–100)
PLATELET # BLD AUTO: 332 10E3/UL (ref 150–450)
RBC # BLD AUTO: 4.85 10E6/UL (ref 3.8–5.2)
WBC # BLD AUTO: 4.7 10E3/UL (ref 4–11)

## 2024-04-18 PROCEDURE — 80061 LIPID PANEL: CPT

## 2024-04-18 PROCEDURE — 83036 HEMOGLOBIN GLYCOSYLATED A1C: CPT

## 2024-04-18 PROCEDURE — 80053 COMPREHEN METABOLIC PANEL: CPT

## 2024-04-18 PROCEDURE — 36415 COLL VENOUS BLD VENIPUNCTURE: CPT

## 2024-04-18 PROCEDURE — 85027 COMPLETE CBC AUTOMATED: CPT

## 2024-04-18 PROCEDURE — 84443 ASSAY THYROID STIM HORMONE: CPT

## 2024-04-19 LAB
ALBUMIN SERPL BCG-MCNC: 4.6 G/DL (ref 3.5–5.2)
ALP SERPL-CCNC: 79 U/L (ref 40–150)
ALT SERPL W P-5'-P-CCNC: 29 U/L (ref 0–50)
ANION GAP SERPL CALCULATED.3IONS-SCNC: 11 MMOL/L (ref 7–15)
AST SERPL W P-5'-P-CCNC: 24 U/L (ref 0–45)
BILIRUB SERPL-MCNC: 0.5 MG/DL
BUN SERPL-MCNC: 15.2 MG/DL (ref 6–20)
CALCIUM SERPL-MCNC: 9.4 MG/DL (ref 8.6–10)
CHLORIDE SERPL-SCNC: 103 MMOL/L (ref 98–107)
CHOLEST SERPL-MCNC: 195 MG/DL
CREAT SERPL-MCNC: 0.89 MG/DL (ref 0.51–0.95)
DEPRECATED HCO3 PLAS-SCNC: 25 MMOL/L (ref 22–29)
EGFRCR SERPLBLD CKD-EPI 2021: 75 ML/MIN/1.73M2
FASTING STATUS PATIENT QL REPORTED: YES
GLUCOSE SERPL-MCNC: 104 MG/DL (ref 70–99)
HDLC SERPL-MCNC: 45 MG/DL
LDLC SERPL CALC-MCNC: 114 MG/DL
NONHDLC SERPL-MCNC: 150 MG/DL
POTASSIUM SERPL-SCNC: 4.3 MMOL/L (ref 3.4–5.3)
PROT SERPL-MCNC: 7.3 G/DL (ref 6.4–8.3)
SODIUM SERPL-SCNC: 139 MMOL/L (ref 135–145)
TRIGL SERPL-MCNC: 181 MG/DL
TSH SERPL DL<=0.005 MIU/L-ACNC: 2.09 UIU/ML (ref 0.3–4.2)

## 2024-04-22 LAB — HBA1C MFR BLD: 5.9 % (ref 0–5.6)

## 2024-10-20 NOTE — NURSING NOTE
Chief Complaint   Patient presents with     Consult     /85 (BP Location: Right arm, Patient Position: Sitting, Cuff Size: Adult Large)   Pulse 92   Temp 98.2  F (36.8  C) (Oral)   Wt 99.8 kg (220 lb)   SpO2 95%   BMI 34.13 kg/m       Female

## 2025-01-08 ENCOUNTER — PATIENT OUTREACH (OUTPATIENT)
Dept: GASTROENTEROLOGY | Facility: CLINIC | Age: 60
End: 2025-01-08
Payer: COMMERCIAL

## 2025-02-15 ENCOUNTER — E-VISIT (OUTPATIENT)
Dept: URGENT CARE | Facility: CLINIC | Age: 60
End: 2025-02-15
Payer: COMMERCIAL

## 2025-02-15 DIAGNOSIS — R19.7 DIARRHEA OF PRESUMED INFECTIOUS ORIGIN: Primary | ICD-10-CM

## 2025-02-15 RX ORDER — AZITHROMYCIN 500 MG/1
500 TABLET, FILM COATED ORAL DAILY
Qty: 3 TABLET | Refills: 0 | Status: SHIPPED | OUTPATIENT
Start: 2025-02-15 | End: 2025-02-18

## 2025-02-15 NOTE — PATIENT INSTRUCTIONS
Thank you for choosing us for your care.  Your symptoms are consistent with traveler's diarrhea.  Since it hasn't resolved on its own, I've sent in a prescription for an antibiotic called azithromycin to treat this.   View your full visit summary for details by clicking on the link below. Your pharmacist will able to address any questions you may have about the medication.     If you re not feeling better within 2-3 days, please schedule an appointment.  You can schedule an appointment right here in [facility], or call [phone]  If the visit is for the same symptoms as your eVisit, we ll refund the cost of your eVisit if seen within seven days.    Diarrhea: Care Instructions  Overview     Diarrhea is loose, watery stools (bowel movements). The exact cause is often hard to find. Sometimes diarrhea is your body's way of getting rid of what caused an upset stomach. Viruses, food poisoning, and many medicines can cause diarrhea. Some people get diarrhea in response to emotional stress, anxiety, or certain foods.  Almost everyone has diarrhea now and then. It usually isn't serious, and your stools will return to normal soon. The important thing to do is replace the fluids you have lost, so you can prevent dehydration.  The doctor has checked you carefully, but problems can develop later. If you notice any problems or new symptoms, get medical treatment right away.  Follow-up care is a key part of your treatment and safety. Be sure to make and go to all appointments, and call your doctor if you are having problems. It's also a good idea to know your test results and keep a list of the medicines you take.  How can you care for yourself at home?  Watch for signs of dehydration, which means your body has lost too much water. Dehydration is a serious condition and should be treated right away. Signs of dehydration are:  Increasing thirst and dry eyes and mouth.  Feeling faint or lightheaded.  A smaller amount of urine than  "normal.  To prevent dehydration, drink plenty of fluids. Choose water and other clear liquids until you feel better. If you have kidney, heart, or liver disease and have to limit fluids, talk with your doctor before you increase the amount of fluids you drink.  When you feel like eating, start with small amounts of food.  The doctor may recommend that you take over-the-counter medicine, such as loperamide (Imodium). Read and follow all instructions on the label. Do not use this medicine if you have bloody diarrhea, a high fever, or other signs of serious illness. Call your doctor if you think you are having a problem with your medicine.  When should you call for help?   Call 911 anytime you think you may need emergency care. For example, call if:    You passed out (lost consciousness).     Your stools are maroon or very bloody.   Call your doctor now or seek immediate medical care if:    You are dizzy or lightheaded, or you feel like you may faint.     Your stools are black and look like tar, or they have streaks of blood.     You have new or worse belly pain.     You have symptoms of dehydration, such as:  Dry eyes and a dry mouth.  Passing only a little urine.  Cannot keep fluids down.     You have a new or higher fever.   Watch closely for changes in your health, and be sure to contact your doctor if:    Your diarrhea is getting worse.     You see pus in the diarrhea.     You are not getting better after 2 days (48 hours).   Where can you learn more?  Go to https://www.TransNet.net/patiented  Enter W335 in the search box to learn more about \"Diarrhea: Care Instructions.\"  Current as of: October 19, 2023  Content Version: 14.3    2024 [employer].   Care instructions adapted under license by your healthcare professional. If you have questions about a medical condition or this instruction, always ask your healthcare professional. [employer] disclaims any warranty or liability for your use " of this information.

## 2025-05-10 ENCOUNTER — HEALTH MAINTENANCE LETTER (OUTPATIENT)
Age: 60
End: 2025-05-10

## 2025-05-27 DIAGNOSIS — E78.2 MIXED HYPERLIPIDEMIA: ICD-10-CM

## 2025-05-27 NOTE — TELEPHONE ENCOUNTER
It appears I saw this patient for a physical last year in April.  Does she plan to continue to see me?  If so, please assist her with scheduling an updated physical and I will bridge to that appointment.  Let me know if she is being seen elsewhere.

## 2025-05-29 RX ORDER — ATORVASTATIN CALCIUM 10 MG/1
10 TABLET, FILM COATED ORAL AT BEDTIME
Qty: 90 TABLET | Refills: 3 | OUTPATIENT
Start: 2025-05-29

## 2025-05-29 RX ORDER — ATORVASTATIN CALCIUM 10 MG/1
10 TABLET, FILM COATED ORAL AT BEDTIME
Qty: 90 TABLET | Refills: 0 | Status: SHIPPED | OUTPATIENT
Start: 2025-05-29

## 2025-05-29 NOTE — TELEPHONE ENCOUNTER
I can not refill meds pre-emptive ly for patients I have never met.   Since Dr Parsons saw her last, it is up to her to send bridging refill for 30 days or not.

## 2025-05-29 NOTE — TELEPHONE ENCOUNTER
05/29/2025    Pt called and stated she is going to be establishing care at the [facility] clinic in [facility]     Routing back to RN so they can remove the pending refill.    Nothing else needed at this time.    Alondra Gan

## 2025-05-29 NOTE — TELEPHONE ENCOUNTER
Please resend refill request to Dr. Parsons, informing that pt has est care with Dr. Segura on 6/30.

## 2025-06-05 ENCOUNTER — ANCILLARY PROCEDURE (OUTPATIENT)
Dept: MAMMOGRAPHY | Facility: CLINIC | Age: 60
End: 2025-06-05
Payer: COMMERCIAL

## 2025-06-05 DIAGNOSIS — Z12.31 VISIT FOR SCREENING MAMMOGRAM: ICD-10-CM

## 2025-06-25 SDOH — HEALTH STABILITY: PHYSICAL HEALTH: ON AVERAGE, HOW MANY DAYS PER WEEK DO YOU ENGAGE IN MODERATE TO STRENUOUS EXERCISE (LIKE A BRISK WALK)?: 1 DAY

## 2025-06-25 SDOH — HEALTH STABILITY: PHYSICAL HEALTH: ON AVERAGE, HOW MANY MINUTES DO YOU ENGAGE IN EXERCISE AT THIS LEVEL?: 20 MIN

## 2025-06-25 ASSESSMENT — SOCIAL DETERMINANTS OF HEALTH (SDOH): HOW OFTEN DO YOU GET TOGETHER WITH FRIENDS OR RELATIVES?: MORE THAN THREE TIMES A WEEK

## 2025-06-30 ENCOUNTER — OFFICE VISIT (OUTPATIENT)
Dept: INTERNAL MEDICINE | Facility: CLINIC | Age: 60
End: 2025-06-30
Attending: FAMILY MEDICINE
Payer: COMMERCIAL

## 2025-06-30 VITALS
DIASTOLIC BLOOD PRESSURE: 84 MMHG | WEIGHT: 223 LBS | HEIGHT: 67 IN | BODY MASS INDEX: 35 KG/M2 | TEMPERATURE: 97.7 F | SYSTOLIC BLOOD PRESSURE: 130 MMHG | OXYGEN SATURATION: 97 % | RESPIRATION RATE: 17 BRPM | HEART RATE: 86 BPM

## 2025-06-30 DIAGNOSIS — E78.2 MIXED HYPERLIPIDEMIA: ICD-10-CM

## 2025-06-30 DIAGNOSIS — J30.89 SEASONAL ALLERGIC RHINITIS DUE TO OTHER ALLERGIC TRIGGER: ICD-10-CM

## 2025-06-30 DIAGNOSIS — Z00.00 ANNUAL PHYSICAL EXAM: Primary | ICD-10-CM

## 2025-06-30 DIAGNOSIS — K76.0 NAFLD (NONALCOHOLIC FATTY LIVER DISEASE): ICD-10-CM

## 2025-06-30 DIAGNOSIS — K44.9 HH (HIATUS HERNIA): ICD-10-CM

## 2025-06-30 DIAGNOSIS — Z13.1 SCREENING FOR DIABETES MELLITUS: ICD-10-CM

## 2025-06-30 DIAGNOSIS — E55.9 VITAMIN D DEFICIENCY: ICD-10-CM

## 2025-06-30 DIAGNOSIS — R03.0 ELEVATED BP WITHOUT DIAGNOSIS OF HYPERTENSION: ICD-10-CM

## 2025-06-30 PROCEDURE — G2211 COMPLEX E/M VISIT ADD ON: HCPCS | Performed by: INTERNAL MEDICINE

## 2025-06-30 PROCEDURE — 1126F AMNT PAIN NOTED NONE PRSNT: CPT | Performed by: INTERNAL MEDICINE

## 2025-06-30 PROCEDURE — 90677 PCV20 VACCINE IM: CPT | Performed by: INTERNAL MEDICINE

## 2025-06-30 PROCEDURE — 99396 PREV VISIT EST AGE 40-64: CPT | Mod: 25 | Performed by: INTERNAL MEDICINE

## 2025-06-30 PROCEDURE — 3075F SYST BP GE 130 - 139MM HG: CPT | Performed by: INTERNAL MEDICINE

## 2025-06-30 PROCEDURE — 99214 OFFICE O/P EST MOD 30 MIN: CPT | Mod: 25 | Performed by: INTERNAL MEDICINE

## 2025-06-30 PROCEDURE — 3079F DIAST BP 80-89 MM HG: CPT | Performed by: INTERNAL MEDICINE

## 2025-06-30 PROCEDURE — 90471 IMMUNIZATION ADMIN: CPT | Performed by: INTERNAL MEDICINE

## 2025-06-30 ASSESSMENT — PAIN SCALES - GENERAL
PAINLEVEL_OUTOF10: NO PAIN (0)
PAINLEVEL_OUTOF10: NO PAIN (0)
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
## 2025-06-30 NOTE — PATIENT INSTRUCTIONS
Monitor b/p at home for 2 weeks daily and let me know results. Goal b/p <130/80 most of the time.     2. Pseudoephedrine, salt, alcohol can increase b/p. Can try Allegra without D part and can use Flonase (or  Nasonex) in addition. Singulair can be added as well to antihistamine ( Rx needed) .  Avoid pets in bedroom, try air purifier in the bedroom.     3. Schedule fasting lab appointment.    4. Pneumonia shot today    5. Colon cancer screen is due in 2026. Next pap smear is due in 2027.    6. If stress. Mood is getting worse, let me know and we can try antidepressant. Can try Magnesium Glycinate 250-500 mg  supplement at night to help with sleep.     7. Calcium needed 1200 mg a day, each dairy is 300 mg of Ca, take it easy on cheese.   Try to eat foods with lower total carbohydrate levels. Avoid sweet beverages.

## 2025-06-30 NOTE — PROGRESS NOTES
Prior to immunization administration, verified patients identity using patient s name and date of birth. Please see Immunization Activity for additional information.     Screening Questionnaire for Adult Immunization    Are you sick today?   No   Do you have allergies to medications, food, a vaccine component or latex?   No   Have you ever had a serious reaction after receiving a vaccination?   No   Do you have a long-term health problem with heart, lung, kidney, or metabolic disease (e.g., diabetes), asthma, a blood disorder, no spleen, complement component deficiency, a cochlear implant, or a spinal fluid leak?  Are you on long-term aspirin therapy?   No   Do you have cancer, leukemia, HIV/AIDS, or any other immune system problem?   No   Do you have a parent, brother, or sister with an immune system problem?   No   In the past 3 months, have you taken medications that affect  your immune system, such as prednisone, other steroids, or anticancer drugs; drugs for the treatment of rheumatoid arthritis, Crohn s disease, or psoriasis; or have you had radiation treatments?   No   Have you had a seizure, or a brain or other nervous system problem?   No   During the past year, have you received a transfusion of blood or blood    products, or been given immune (gamma) globulin or antiviral drug?   No   For women: Are you pregnant or is there a chance you could become       pregnant during the next month?   No   Have you received any vaccinations in the past 4 weeks?   No     Immunization questionnaire answers were all negative.    Pt tolerated injections. Site was cleansed with alcohol prior to injections. No pain, burning, swelling or redness at the site of the injection. Patient instructed to remain in clinic for 15 minutes afterwards, and to report any adverse reactions.    Screening performed by Snow Lee RN on 6/30/2025 at 3:22 PM.

## 2025-07-01 ENCOUNTER — LAB (OUTPATIENT)
Dept: LAB | Facility: CLINIC | Age: 60
End: 2025-07-01
Payer: COMMERCIAL

## 2025-07-01 DIAGNOSIS — J30.89 SEASONAL ALLERGIC RHINITIS DUE TO OTHER ALLERGIC TRIGGER: ICD-10-CM

## 2025-07-01 DIAGNOSIS — E55.9 VITAMIN D DEFICIENCY: ICD-10-CM

## 2025-07-01 DIAGNOSIS — K76.0 NAFLD (NONALCOHOLIC FATTY LIVER DISEASE): ICD-10-CM

## 2025-07-01 DIAGNOSIS — E78.2 MIXED HYPERLIPIDEMIA: ICD-10-CM

## 2025-07-01 DIAGNOSIS — Z13.1 SCREENING FOR DIABETES MELLITUS: ICD-10-CM

## 2025-07-01 DIAGNOSIS — K44.9 HH (HIATUS HERNIA): ICD-10-CM

## 2025-07-01 LAB
ALBUMIN SERPL BCG-MCNC: 4.4 G/DL (ref 3.5–5.2)
ALP SERPL-CCNC: 78 U/L (ref 40–150)
ALT SERPL W P-5'-P-CCNC: 37 U/L (ref 0–50)
ANION GAP SERPL CALCULATED.3IONS-SCNC: 14 MMOL/L (ref 7–15)
AST SERPL W P-5'-P-CCNC: 29 U/L (ref 0–45)
BASOPHILS # BLD AUTO: 0 10E3/UL (ref 0–0.2)
BASOPHILS NFR BLD AUTO: 1 %
BILIRUB SERPL-MCNC: 0.4 MG/DL
BUN SERPL-MCNC: 12.8 MG/DL (ref 6–20)
CALCIUM SERPL-MCNC: 9.4 MG/DL (ref 8.8–10.4)
CHLORIDE SERPL-SCNC: 104 MMOL/L (ref 98–107)
CHOLEST SERPL-MCNC: 206 MG/DL
CREAT SERPL-MCNC: 0.86 MG/DL (ref 0.51–0.95)
EGFRCR SERPLBLD CKD-EPI 2021: 78 ML/MIN/1.73M2
EOSINOPHIL # BLD AUTO: 0.1 10E3/UL (ref 0–0.7)
EOSINOPHIL NFR BLD AUTO: 2 %
ERYTHROCYTE [DISTWIDTH] IN BLOOD BY AUTOMATED COUNT: 12.1 % (ref 10–15)
EST. AVERAGE GLUCOSE BLD GHB EST-MCNC: 114 MG/DL
FASTING STATUS PATIENT QL REPORTED: YES
FASTING STATUS PATIENT QL REPORTED: YES
GLUCOSE SERPL-MCNC: 106 MG/DL (ref 70–99)
HBA1C MFR BLD: 5.6 % (ref 0–5.6)
HCO3 SERPL-SCNC: 21 MMOL/L (ref 22–29)
HCT VFR BLD AUTO: 42.2 % (ref 35–47)
HDLC SERPL-MCNC: 46 MG/DL
HGB BLD-MCNC: 14.3 G/DL (ref 11.7–15.7)
IMM GRANULOCYTES # BLD: 0 10E3/UL
IMM GRANULOCYTES NFR BLD: 0 %
LDLC SERPL CALC-MCNC: 115 MG/DL
LYMPHOCYTES # BLD AUTO: 2.1 10E3/UL (ref 0.8–5.3)
LYMPHOCYTES NFR BLD AUTO: 33 %
MCH RBC QN AUTO: 29.5 PG (ref 26.5–33)
MCHC RBC AUTO-ENTMCNC: 33.9 G/DL (ref 31.5–36.5)
MCV RBC AUTO: 87 FL (ref 78–100)
MONOCYTES # BLD AUTO: 0.4 10E3/UL (ref 0–1.3)
MONOCYTES NFR BLD AUTO: 7 %
NEUTROPHILS # BLD AUTO: 3.7 10E3/UL (ref 1.6–8.3)
NEUTROPHILS NFR BLD AUTO: 57 %
NONHDLC SERPL-MCNC: 160 MG/DL
PLATELET # BLD AUTO: 288 10E3/UL (ref 150–450)
POTASSIUM SERPL-SCNC: 4.5 MMOL/L (ref 3.4–5.3)
PROT SERPL-MCNC: 7 G/DL (ref 6.4–8.3)
RBC # BLD AUTO: 4.85 10E6/UL (ref 3.8–5.2)
SODIUM SERPL-SCNC: 139 MMOL/L (ref 135–145)
TRIGL SERPL-MCNC: 227 MG/DL
TSH SERPL DL<=0.005 MIU/L-ACNC: 2.2 UIU/ML (ref 0.3–4.2)
VIT D+METAB SERPL-MCNC: 29 NG/ML (ref 20–50)
WBC # BLD AUTO: 6.4 10E3/UL (ref 4–11)

## 2025-07-01 PROCEDURE — 80061 LIPID PANEL: CPT

## 2025-07-01 PROCEDURE — 83036 HEMOGLOBIN GLYCOSYLATED A1C: CPT

## 2025-07-01 PROCEDURE — 82306 VITAMIN D 25 HYDROXY: CPT

## 2025-07-01 PROCEDURE — 85025 COMPLETE CBC W/AUTO DIFF WBC: CPT

## 2025-07-01 PROCEDURE — 84443 ASSAY THYROID STIM HORMONE: CPT

## 2025-07-01 PROCEDURE — 80053 COMPREHEN METABOLIC PANEL: CPT

## 2025-07-01 PROCEDURE — 36415 COLL VENOUS BLD VENIPUNCTURE: CPT

## 2025-07-02 ENCOUNTER — RESULTS FOLLOW-UP (OUTPATIENT)
Dept: INTERNAL MEDICINE | Facility: CLINIC | Age: 60
End: 2025-07-02